# Patient Record
Sex: MALE | Race: WHITE | NOT HISPANIC OR LATINO | URBAN - NONMETROPOLITAN AREA
[De-identification: names, ages, dates, MRNs, and addresses within clinical notes are randomized per-mention and may not be internally consistent; named-entity substitution may affect disease eponyms.]

---

## 2017-03-17 ENCOUNTER — TELEPHONE (OUTPATIENT)
Dept: CARDIOLOGY | Facility: CLINIC | Age: 37
End: 2017-03-17

## 2017-03-17 NOTE — TELEPHONE ENCOUNTER
----- Message from Teresita Wynn sent at 3/16/2017 10:18 AM EDT -----  Regarding: MEDICATION  Contact: [phone]  PT NEEDS HIS BLOOD PRESSURE MEDICINE CALLED IN. HE IS COMING IN ON Monday AT 10:00.  HIS BLOOD PRESSURE /168.     PRESCRIPTION SHOP     CAN SOMEONE CALL PT?        I called the patient, he was unavailable but spoke to his wife.   I asked what RX he needed and she said that Sybil  told her he had to keep appt before we would send in refill for his B/P med.

## 2017-03-20 ENCOUNTER — OFFICE VISIT (OUTPATIENT)
Dept: CARDIOLOGY | Facility: CLINIC | Age: 37
End: 2017-03-20

## 2017-03-20 VITALS
DIASTOLIC BLOOD PRESSURE: 92 MMHG | RESPIRATION RATE: 16 BRPM | HEART RATE: 92 BPM | BODY MASS INDEX: 31.54 KG/M2 | SYSTOLIC BLOOD PRESSURE: 151 MMHG | WEIGHT: 238 LBS | HEIGHT: 73 IN

## 2017-03-20 DIAGNOSIS — Z91.199 NONCOMPLIANCE: ICD-10-CM

## 2017-03-20 DIAGNOSIS — R09.89 DECREASED PEDAL PULSES: ICD-10-CM

## 2017-03-20 DIAGNOSIS — I10 ESSENTIAL HYPERTENSION: ICD-10-CM

## 2017-03-20 DIAGNOSIS — R00.2 PALPITATIONS: Primary | ICD-10-CM

## 2017-03-20 PROCEDURE — 99214 OFFICE O/P EST MOD 30 MIN: CPT | Performed by: INTERNAL MEDICINE

## 2017-03-20 PROCEDURE — 93000 ELECTROCARDIOGRAM COMPLETE: CPT | Performed by: INTERNAL MEDICINE

## 2017-03-20 RX ORDER — AMLODIPINE BESYLATE 5 MG/1
5 TABLET ORAL DAILY
Qty: 30 TABLET | Refills: 3 | Status: SHIPPED | OUTPATIENT
Start: 2017-03-20 | End: 2017-12-13 | Stop reason: SDUPTHER

## 2017-03-20 RX ORDER — METOPROLOL TARTRATE 50 MG/1
50 TABLET, FILM COATED ORAL 2 TIMES DAILY
Qty: 30 TABLET | Refills: 3 | Status: SHIPPED | OUTPATIENT
Start: 2017-03-20 | End: 2017-03-22 | Stop reason: SDUPTHER

## 2017-03-20 NOTE — PROGRESS NOTES
"Bry Rodriguez  1980  03/20/2017   Ref:No referring provider defined for this encounter.  Pcp: Marek Stein MD  [facility] [address]    Follow up for:  Chief Complaint   Patient presents with   • Follow-up     1 & 1/2 years   • Shortness of Breath     \"some\"   • Palpitations     flutters        Patient Active Problem List   Diagnosis   • Hypertension with elevated diastolic blood pressure today   • Palpitations, no further   • Chronic minimal ST elevation with no acute changes.   • Decreased pedal pulses   • Noncompliance       HPI     Bry Rodriguez is a 35 yo male who presents to our office today for a follow-up of hypertension. Patient notes that he has been out of his BP medications for the last 2 months. He notes occasional palpitations \"flutters\".  Patient notes exercise of walking 1-2 miles every other day. He denies chest pain, shortness of breath, edema, dizziness, and no syncope. He has a high sodium diet.    Review of Systems   Constitution: Negative for chills and fever.   HENT: Negative for headaches, nosebleeds and sore throat.    Cardiovascular: Positive for palpitations.   Respiratory: Positive for shortness of breath. Negative for cough, hemoptysis and wheezing.    Gastrointestinal: Negative for abdominal pain, hematemesis, hematochezia, melena, nausea and vomiting.   Genitourinary: Negative for dysuria and hematuria.         Current Outpatient Prescriptions:   •  amLODIPine (NORVASC) 5 MG tablet, Take 1 tablet by mouth Daily., Disp: 30 tablet, Rfl: 3  •  metoprolol tartrate (LOPRESSOR) 50 MG tablet, Take 1 tablet by mouth 2 (Two) Times a Day., Disp: 30 tablet, Rfl: 3    No Known Allergies    Visit Vitals   • /92   • Pulse 92   • Resp 16   • Ht 73\" (185.4 cm)   • Wt 238 lb (108 kg)   • BMI 31.4 kg/m2          Physical Exam   Constitutional: He is oriented to person, place, and time. He appears well-developed and well-nourished. No distress.   Moderately obese  "   Neck: No JVD present. No tracheal deviation present.   Cardiovascular: Normal rate, regular rhythm, normal heart sounds and intact distal pulses.  Exam reveals no gallop and no friction rub.    No murmur heard.  Pulses:       Carotid pulses are 2+ on the right side, and 2+ on the left side.       Dorsalis pedis pulses are 1+ on the right side, and 1+ on the left side.        Posterior tibial pulses are 1+ on the right side, and 1+ on the left side.   Pulmonary/Chest: Effort normal and breath sounds normal. No respiratory distress. He has no wheezes. He has no rales. He exhibits no tenderness.   Abdominal: Soft. Bowel sounds are normal. He exhibits no distension and no mass. There is no tenderness. There is no rebound and no guarding.   Moderately obese   Musculoskeletal: He exhibits no edema or tenderness.   Neurological: He is alert and oriented to person, place, and time. No cranial nerve deficit. He exhibits normal muscle tone. Coordination normal.   Skin: Skin is warm and dry. No rash noted. He is not diaphoretic. No erythema. No pallor.   Psychiatric: He has a normal mood and affect. His behavior is normal. Judgment and thought content normal.   :    Lab Review:      ECG 12 Lead  Date/Time: 3/20/2017 10:24 AM  Performed by: JANNETH GASCA  Authorized by: JANNETH GASCA   Comparison: compared with previous ECG from 8/3/2015  Similar to previous ECG  Rhythm: sinus rhythm  Rate: normal  BPM: 78  Conduction: conduction normal  ST Segments: ST segments normal  T Waves: T waves normal  QRS axis: normal  Other findings: early repolarization  Clinical impression: abnormal ECG  Comments: QTc: 405            Lab Results   Component Value Date     01/17/2014    K 4.1 01/17/2014     01/17/2014    CO2 27.5 01/17/2014    BUN 11 01/17/2014    CREATININE 1.00 01/17/2014    GLUCOSE 106 (H) 01/17/2014    CALCIUM 9.5 01/17/2014    AST 43 (H) 01/17/2014    ALT 68 (H) 01/17/2014    ALKPHOS 50 01/17/2014     LABIL2 1.5 01/17/2014     No results found for: CKTOTAL  No results found for: WBC, HGB, HCT, PLT  No results found for: INR  No results found for: MG  No results found for: TSH, PSA, CHLPL, TRIG, HDL, LDL   No results found for: BNP    Chemistry        Component Value Date/Time     01/17/2014 0921    K 4.1 01/17/2014 0921     01/17/2014 0921    CO2 27.5 01/17/2014 0921    BUN 11 01/17/2014 0921    CREATININE 1.00 01/17/2014 0921        Component Value Date/Time    CALCIUM 9.5 01/17/2014 0921    ALKPHOS 50 01/17/2014 0921    AST 43 (H) 01/17/2014 0921    ALT 68 (H) 01/17/2014 0921    BILITOT 1.0 01/17/2014 0921            Impression:   Diagnosis Plan   1. Palpitations, no further     2. Essential hypertension  ECG 12 Lead    amLODIPine (NORVASC) 5 MG tablet    metoprolol tartrate (LOPRESSOR) 50 MG tablet    Basic Metabolic Panel    Lipid Panel   3. Noncompliance     4. Decreased pedal pulses         Plan:  1. Restart amlodipine 5 mg daily and Metoprolol tart 50 mg BID.   2. BMP  3. Fasting Lipid panel       Return in about 3 months (around 6/20/2017) for Recheck, Or sooner if needed.     This document signed by David Rico MD March 20, 2017 2:36 PM     I, David Rico MD, personally performed the services described in this documentation as scribed by the above named individual in my presence, and it is both accurate and complete.  5/8/2017  5:30 PM

## 2017-03-22 DIAGNOSIS — I10 ESSENTIAL HYPERTENSION: ICD-10-CM

## 2017-03-22 RX ORDER — METOPROLOL TARTRATE 50 MG/1
50 TABLET, FILM COATED ORAL 2 TIMES DAILY
Qty: 60 TABLET | Refills: 3
Start: 2017-03-22 | End: 2017-12-13 | Stop reason: SDUPTHER

## 2017-12-13 ENCOUNTER — TELEPHONE (OUTPATIENT)
Dept: CARDIOLOGY | Facility: CLINIC | Age: 37
End: 2017-12-13

## 2017-12-13 DIAGNOSIS — I10 ESSENTIAL HYPERTENSION: ICD-10-CM

## 2017-12-13 RX ORDER — AMLODIPINE BESYLATE 5 MG/1
5 TABLET ORAL DAILY
Qty: 30 TABLET | Refills: 2 | Status: SHIPPED | OUTPATIENT
Start: 2017-12-13 | End: 2022-08-15

## 2017-12-13 RX ORDER — METOPROLOL TARTRATE 50 MG/1
50 TABLET, FILM COATED ORAL 2 TIMES DAILY
Qty: 60 TABLET | Refills: 2
Start: 2017-12-13 | End: 2018-01-11 | Stop reason: SDUPTHER

## 2017-12-13 NOTE — TELEPHONE ENCOUNTER
Pt's pharmacy had sent over a refill request for his Metoprolol Tart 50 mg BID and Amlodipine 5 mg QD. I called pt to advise him. I spoke with his wife Lyric and advised her that Bry would need to make an apt and keep it to receive any further refills. She expressed understanding. I made him and apt for 2.5.18 at 2:40 with . I will send him enough of his medicine to do him til then.

## 2018-01-11 DIAGNOSIS — I10 ESSENTIAL HYPERTENSION: ICD-10-CM

## 2018-01-11 RX ORDER — METOPROLOL TARTRATE 50 MG/1
50 TABLET, FILM COATED ORAL EVERY 12 HOURS SCHEDULED
Qty: 60 TABLET | Refills: 0 | OUTPATIENT
Start: 2018-01-11

## 2022-07-01 ENCOUNTER — APPOINTMENT (OUTPATIENT)
Dept: ULTRASOUND IMAGING | Facility: HOSPITAL | Age: 42
End: 2022-07-01

## 2022-07-01 ENCOUNTER — TRANSCRIBE ORDERS (OUTPATIENT)
Dept: ADMINISTRATIVE | Facility: HOSPITAL | Age: 42
End: 2022-07-01

## 2022-07-01 DIAGNOSIS — R10.11 ABDOMINAL PAIN, RIGHT UPPER QUADRANT: Primary | ICD-10-CM

## 2022-07-07 ENCOUNTER — HOSPITAL ENCOUNTER (OUTPATIENT)
Dept: ULTRASOUND IMAGING | Facility: HOSPITAL | Age: 42
Discharge: HOME | End: 2022-07-07
Admitting: NURSE PRACTITIONER

## 2022-07-07 DIAGNOSIS — R10.11 ABDOMINAL PAIN, RIGHT UPPER QUADRANT: ICD-10-CM

## 2022-07-07 PROCEDURE — 76700 US EXAM ABDOM COMPLETE: CPT | Performed by: RADIOLOGY

## 2022-07-07 PROCEDURE — 76700 US EXAM ABDOM COMPLETE: CPT

## 2022-08-10 ENCOUNTER — OFFICE VISIT (OUTPATIENT)
Dept: SURGERY | Facility: CLINIC | Age: 42
End: 2022-08-10

## 2022-08-10 VITALS
SYSTOLIC BLOOD PRESSURE: 110 MMHG | DIASTOLIC BLOOD PRESSURE: 76 MMHG | HEIGHT: 73 IN | BODY MASS INDEX: 27.8 KG/M2 | WEIGHT: 209.8 LBS

## 2022-08-10 DIAGNOSIS — K80.20 CALCULUS OF GALLBLADDER WITHOUT CHOLECYSTITIS WITHOUT OBSTRUCTION: Primary | ICD-10-CM

## 2022-08-10 PROCEDURE — 99203 OFFICE O/P NEW LOW 30 MIN: CPT | Performed by: SURGERY

## 2022-08-10 RX ORDER — HYDROCODONE BITARTRATE AND ACETAMINOPHEN 5; 325 MG/1; MG/1
TABLET ORAL
COMMUNITY
Start: 2022-08-04 | End: 2022-08-15

## 2022-08-10 RX ORDER — PANTOPRAZOLE SODIUM 40 MG/1
TABLET, DELAYED RELEASE ORAL
COMMUNITY
Start: 2022-07-31

## 2022-08-10 RX ORDER — INDOCYANINE GREEN AND WATER 25 MG
2.5 KIT INJECTION ONCE
Status: CANCELLED | OUTPATIENT
Start: 2022-08-10 | End: 2022-08-10

## 2022-08-10 NOTE — PROGRESS NOTES
"Subjective   Bry Rodriguez is a 42 y.o. male is being seen for consultation today at the request of Nallely Poon FNP    Bry Rodriguez is a 42 y.o. male With right upper quadrant pain after fatty greasy meals for several months.  Pain radiated to the right back and epigastrium.  A severe episode occurred requiring ER presentation with CT scan showing gallstones.  No signs of cholecystitis.  No previous abdominal surgery.      Past Medical History:   Diagnosis Date   • Hypertension    • Palpitations        Family History   Problem Relation Age of Onset   • Heart attack Mother 48   • Heart attack Father 64   • Heart disease Maternal Grandmother    • Heart disease Maternal Grandfather        Social History     Socioeconomic History   • Marital status:    Tobacco Use   • Smoking status: Never Smoker   • Smokeless tobacco: Never Used   Vaping Use   • Vaping Use: Never used   Substance and Sexual Activity   • Alcohol use: Yes     Comment: \"drinks socially\"   • Drug use: No   • Sexual activity: Defer       History reviewed. No pertinent surgical history.    Review of Systems   Constitutional: Negative for activity change, appetite change, chills and fever.   HENT: Negative for sore throat and trouble swallowing.    Eyes: Negative for visual disturbance.   Respiratory: Negative for cough and shortness of breath.    Cardiovascular: Negative for chest pain and palpitations.   Gastrointestinal: Positive for abdominal pain. Negative for abdominal distention, blood in stool, constipation, diarrhea, nausea and vomiting.   Endocrine: Negative for cold intolerance and heat intolerance.   Genitourinary: Negative for dysuria.   Musculoskeletal: Negative for joint swelling.   Skin: Negative for color change, rash and wound.   Allergic/Immunologic: Negative for immunocompromised state.   Neurological: Negative for dizziness, seizures, weakness and headaches.   Hematological: Negative for adenopathy. Does not " "bruise/bleed easily.   Psychiatric/Behavioral: Negative for agitation and confusion.         /76   Ht 185.4 cm (73\")   Wt 95.2 kg (209 lb 12.8 oz)   BMI 27.68 kg/m²   Objective   Physical Exam  Constitutional:       Appearance: He is well-developed.   HENT:      Head: Normocephalic and atraumatic.   Eyes:      Conjunctiva/sclera: Conjunctivae normal.      Pupils: Pupils are equal, round, and reactive to light.   Neck:      Thyroid: No thyromegaly.      Vascular: No JVD.      Trachea: No tracheal deviation.   Cardiovascular:      Rate and Rhythm: Normal rate and regular rhythm.      Heart sounds: No murmur heard.    No friction rub. No gallop.   Pulmonary:      Effort: Pulmonary effort is normal.      Breath sounds: Normal breath sounds.   Abdominal:      General: There is no distension.      Palpations: Abdomen is soft. There is no hepatomegaly or splenomegaly.      Tenderness: There is no abdominal tenderness.      Hernia: No hernia is present.   Musculoskeletal:         General: No deformity. Normal range of motion.      Cervical back: Neck supple.   Skin:     General: Skin is warm and dry.   Neurological:      Mental Status: He is alert and oriented to person, place, and time.               Assessment   Diagnoses and all orders for this visit:    1. Calculus of gallbladder without cholecystitis without obstruction (Primary)  -     Case Request; Standing  -     CBC & Differential; Future  -     Comprehensive Metabolic Panel; Future  -     Case Request    Other orders  -     SCANNED - IMAGING  -     Follow Anesthesia Guidelines / Standing Orders; Future  -     Obtain Informed Consent; Future  -     Provide NPO Instructions to Patient; Future  -     Chlorhexidine Skin Prep; Future      Bry Rodriguez is a 42 y.o. male with symptomatic cholelithiasis.  Risks and benefits have been discussed with the patient and he will proceed with cholecystectomy.    BMI is >= 25 and <30. (Overweight) The following " options were offered after discussion;: weight loss educational material (shared in after visit summary)

## 2022-08-11 ENCOUNTER — TELEPHONE (OUTPATIENT)
Dept: SURGERY | Facility: CLINIC | Age: 42
End: 2022-08-11

## 2022-08-12 ENCOUNTER — TELEPHONE (OUTPATIENT)
Dept: SURGERY | Facility: CLINIC | Age: 42
End: 2022-08-12

## 2022-08-12 NOTE — TELEPHONE ENCOUNTER
Second attempt to contact patient about his upcoming surgery with . Left a message for patient to return my call today.       DL 8/12/22 10:40am

## 2022-08-15 ENCOUNTER — PRE-ADMISSION TESTING (OUTPATIENT)
Dept: PREADMISSION TESTING | Facility: HOSPITAL | Age: 42
End: 2022-08-15

## 2022-08-15 PROCEDURE — 93005 ELECTROCARDIOGRAM TRACING: CPT

## 2022-08-15 PROCEDURE — 93010 ELECTROCARDIOGRAM REPORT: CPT | Performed by: INTERNAL MEDICINE

## 2022-08-15 NOTE — DISCHARGE INSTRUCTIONS
8/18/22  ARRIVAL TIME PER DR N[place] OFFICE    TAKE the following medications the morning of surgery:  All heart or blood pressure medications    HOLD all diabetic medications the morning of surgery as ordered by physician.    Please discontinue all blood thinners and anticoagulants (except aspirin) prior to surgery as per your surgeon and cardiologist instructions.  Aspirin may be continued up to the day prior to surgery.     CHLORHEXIDINE CLOTHS GIVEN WITH INSTRUCTIONS AND FORM TO RETURN TO HOSPITAL, IF APPLICABLE.    General Instructions:  Do not eat or drink after midnight: includes water, mints, or gum. You may brush your teeth.  Dental appliances that are removable must be taken out day of surgery.  Do not smoke, chew tobacco, or drink alcohol.  Bring medications in original bottles, any inhalers and if applicable your C-PAP/BI-PAP machine.  Bring any papers given to you in the doctor's office.  Wear clean comfortable clothes and socks.  Do not wear contact lenses or make-up. Bring a case for your glasses if applicable.  Bring crutches or walker if applicable.  Leave all other valuables and jewelry at home.    If you were given a blood bank ID arm band remember to bring it with you the day of surgery.    Preventing a Surgical Site Infection:  Shower the night before surgery (unless instructed other wise) using a fresh bar of anti-bacterial soap (such as Dial) and clean washcloth. Dry with a clean towel and dress in clean clothing.  For 2 to 3 days before surgery, avoid shaving with a razor near where you will have surgery because the razor can irritate skin and make it easier to develop an infection. Ask your surgeon if you will be receiving antibiotics prior to surgery.  Make sure you, your family, and all healthcare providers clear their hands with soap and water or an alcohol-based hand  before caring for you or your wound.  If at all possible, quit smoking as many days before surgery as you  can.    Day of surgery:  Upon arrival, a Pre-op nurse and Anesthesiologist will review your health history, obtain vital signs, and answer questions you may have. The only belongings needed at this time will be your home medications and if applicable your C-PAP/BI-PAP machine. If you are staying overnight your family can leave the rest of your belongings in the car and bring them to your room later. A Pre-op nurse will start an IV and you may receive medication in preparation for surgery, including something to help you relax. Your family will be able to see you in the Pre-op area. While you are in surgery your family should notify the waiting room  if they leave the waiting room area and provide a contact phone number.    Please be aware that surgery does come with discomfort. We want to make every effort to control your discomfort so please discuss any uncontrolled symptoms with your nurse. Your doctor will most likely have prescribed pain medications.    If you are going home after surgery you will receive individualized written care instructions before being discharged. A responsible adult must drive you to and from the hospital on the day of surgery and stay with you for 24 hours.    If you are staying overnight following surgery, you will be transported to your hospital room following the recovery period.  [facility] has all private rooms.    If you have any questions please call Pre-Admission Testing at [phone].  Deductibles and co-payments are collected on the day of service. Please be prepared to pay the required co-pay, deductible or deposit on the day of service as defined by your plan.    A RESPONSIBLE PERSON MUST REMAIN IN THE WAITING ROOM DURING YOUR PROCEDURE AND A RESPONSIBLE  MUST BE AVAILABLE UPON YOUR DISCHARGE.

## 2022-08-16 LAB
QT INTERVAL: 406 MS
QTC INTERVAL: 415 MS

## 2022-08-18 ENCOUNTER — APPOINTMENT (OUTPATIENT)
Dept: GENERAL RADIOLOGY | Facility: HOSPITAL | Age: 42
End: 2022-08-18

## 2022-08-18 ENCOUNTER — HOSPITAL ENCOUNTER (OUTPATIENT)
Facility: HOSPITAL | Age: 42
Setting detail: HOSPITAL OUTPATIENT SURGERY
Discharge: HOME | End: 2022-08-18
Attending: SURGERY | Admitting: SURGERY

## 2022-08-18 ENCOUNTER — ANESTHESIA EVENT (OUTPATIENT)
Dept: PERIOP | Facility: HOSPITAL | Age: 42
End: 2022-08-18

## 2022-08-18 ENCOUNTER — ANESTHESIA (OUTPATIENT)
Dept: PERIOP | Facility: HOSPITAL | Age: 42
End: 2022-08-18

## 2022-08-18 VITALS
HEIGHT: 74 IN | HEART RATE: 53 BPM | TEMPERATURE: 97.6 F | SYSTOLIC BLOOD PRESSURE: 120 MMHG | RESPIRATION RATE: 18 BRPM | DIASTOLIC BLOOD PRESSURE: 79 MMHG | BODY MASS INDEX: 26.15 KG/M2 | OXYGEN SATURATION: 96 % | WEIGHT: 203.8 LBS

## 2022-08-18 DIAGNOSIS — K80.20 CALCULUS OF GALLBLADDER WITHOUT CHOLECYSTITIS WITHOUT OBSTRUCTION: ICD-10-CM

## 2022-08-18 PROCEDURE — 25010000002 NEOSTIGMINE 10 MG/10ML SOLUTION: Performed by: NURSE ANESTHETIST, CERTIFIED REGISTERED

## 2022-08-18 PROCEDURE — 25010000002 MIDAZOLAM PER 1 MG: Performed by: NURSE ANESTHETIST, CERTIFIED REGISTERED

## 2022-08-18 PROCEDURE — 25010000002 FENTANYL CITRATE (PF) 50 MCG/ML SOLUTION: Performed by: NURSE ANESTHETIST, CERTIFIED REGISTERED

## 2022-08-18 PROCEDURE — 25010000002 ROPIVACAINE PER 1 MG: Performed by: ANESTHESIOLOGY

## 2022-08-18 PROCEDURE — 0 LIDOCAINE 1 % SOLUTION: Performed by: NURSE ANESTHETIST, CERTIFIED REGISTERED

## 2022-08-18 PROCEDURE — 25010000002 KETOROLAC TROMETHAMINE PER 15 MG: Performed by: NURSE ANESTHETIST, CERTIFIED REGISTERED

## 2022-08-18 PROCEDURE — 25010000002 DEXAMETHASONE PER 1 MG: Performed by: ANESTHESIOLOGY

## 2022-08-18 PROCEDURE — 47562 LAPAROSCOPIC CHOLECYSTECTOMY: CPT | Performed by: SURGERY

## 2022-08-18 PROCEDURE — 25010000002 ONDANSETRON PER 1 MG: Performed by: NURSE ANESTHETIST, CERTIFIED REGISTERED

## 2022-08-18 PROCEDURE — 25010000002 PROPOFOL 10 MG/ML EMULSION: Performed by: NURSE ANESTHETIST, CERTIFIED REGISTERED

## 2022-08-18 PROCEDURE — 25010000002 AMPICILLIN-SULBACTAM PER 1.5 G: Performed by: SURGERY

## 2022-08-18 PROCEDURE — S2900 ROBOTIC SURGICAL SYSTEM: HCPCS | Performed by: SURGERY

## 2022-08-18 PROCEDURE — 25010000002 BUPRENORPHINE PER 0.1 MG: Performed by: ANESTHESIOLOGY

## 2022-08-18 DEVICE — HEMOLOK L 6 CLIPS/CART
Type: IMPLANTABLE DEVICE | Site: ABDOMEN | Status: FUNCTIONAL
Brand: WECK

## 2022-08-18 RX ORDER — ROPIVACAINE HYDROCHLORIDE 5 MG/ML
INJECTION, SOLUTION EPIDURAL; INFILTRATION; PERINEURAL
Status: COMPLETED | OUTPATIENT
Start: 2022-08-18 | End: 2022-08-18

## 2022-08-18 RX ORDER — KETOROLAC TROMETHAMINE 30 MG/ML
30 INJECTION, SOLUTION INTRAMUSCULAR; INTRAVENOUS EVERY 6 HOURS PRN
Status: COMPLETED | OUTPATIENT
Start: 2022-08-18 | End: 2022-08-18

## 2022-08-18 RX ORDER — FENTANYL CITRATE 50 UG/ML
50 INJECTION, SOLUTION INTRAMUSCULAR; INTRAVENOUS
Status: DISCONTINUED | OUTPATIENT
Start: 2022-08-18 | End: 2022-08-18 | Stop reason: HOSPADM

## 2022-08-18 RX ORDER — ACETAMINOPHEN 500 MG
1000 TABLET ORAL EVERY 6 HOURS PRN
COMMUNITY

## 2022-08-18 RX ORDER — ACETAMINOPHEN 325 MG/1
650 TABLET ORAL EVERY 4 HOURS PRN
Qty: 30 TABLET | Refills: 0 | Status: SHIPPED | OUTPATIENT
Start: 2022-08-18

## 2022-08-18 RX ORDER — FENTANYL CITRATE 50 UG/ML
INJECTION, SOLUTION INTRAMUSCULAR; INTRAVENOUS AS NEEDED
Status: DISCONTINUED | OUTPATIENT
Start: 2022-08-18 | End: 2022-08-18 | Stop reason: SURG

## 2022-08-18 RX ORDER — ROCURONIUM BROMIDE 10 MG/ML
INJECTION, SOLUTION INTRAVENOUS AS NEEDED
Status: DISCONTINUED | OUTPATIENT
Start: 2022-08-18 | End: 2022-08-18 | Stop reason: SURG

## 2022-08-18 RX ORDER — SODIUM CHLORIDE, SODIUM LACTATE, POTASSIUM CHLORIDE, CALCIUM CHLORIDE 600; 310; 30; 20 MG/100ML; MG/100ML; MG/100ML; MG/100ML
INJECTION, SOLUTION INTRAVENOUS CONTINUOUS PRN
Status: DISCONTINUED | OUTPATIENT
Start: 2022-08-18 | End: 2022-08-18 | Stop reason: SURG

## 2022-08-18 RX ORDER — IBUPROFEN 600 MG/1
600 TABLET ORAL EVERY 6 HOURS PRN
Qty: 30 TABLET | Refills: 0 | Status: SHIPPED | OUTPATIENT
Start: 2022-08-18

## 2022-08-18 RX ORDER — TRAMADOL HYDROCHLORIDE 50 MG/1
50 TABLET ORAL EVERY 8 HOURS PRN
Qty: 8 TABLET | Refills: 0 | Status: SHIPPED | OUTPATIENT
Start: 2022-08-18

## 2022-08-18 RX ORDER — SODIUM CHLORIDE 0.9 % (FLUSH) 0.9 %
10 SYRINGE (ML) INJECTION EVERY 12 HOURS SCHEDULED
Status: DISCONTINUED | OUTPATIENT
Start: 2022-08-18 | End: 2022-08-18 | Stop reason: HOSPADM

## 2022-08-18 RX ORDER — MEPERIDINE HYDROCHLORIDE 25 MG/ML
12.5 INJECTION INTRAMUSCULAR; INTRAVENOUS; SUBCUTANEOUS
Status: DISCONTINUED | OUTPATIENT
Start: 2022-08-18 | End: 2022-08-18 | Stop reason: HOSPADM

## 2022-08-18 RX ORDER — MAGNESIUM HYDROXIDE 1200 MG/15ML
LIQUID ORAL AS NEEDED
Status: DISCONTINUED | OUTPATIENT
Start: 2022-08-18 | End: 2022-08-18 | Stop reason: HOSPADM

## 2022-08-18 RX ORDER — ONDANSETRON 2 MG/ML
4 INJECTION INTRAMUSCULAR; INTRAVENOUS AS NEEDED
Status: DISCONTINUED | OUTPATIENT
Start: 2022-08-18 | End: 2022-08-18 | Stop reason: HOSPADM

## 2022-08-18 RX ORDER — MIDAZOLAM HYDROCHLORIDE 1 MG/ML
1 INJECTION INTRAMUSCULAR; INTRAVENOUS
Status: DISCONTINUED | OUTPATIENT
Start: 2022-08-18 | End: 2022-08-18 | Stop reason: HOSPADM

## 2022-08-18 RX ORDER — SODIUM CHLORIDE, SODIUM LACTATE, POTASSIUM CHLORIDE, CALCIUM CHLORIDE 600; 310; 30; 20 MG/100ML; MG/100ML; MG/100ML; MG/100ML
100 INJECTION, SOLUTION INTRAVENOUS ONCE AS NEEDED
Status: DISCONTINUED | OUTPATIENT
Start: 2022-08-18 | End: 2022-08-18 | Stop reason: HOSPADM

## 2022-08-18 RX ORDER — OXYCODONE HYDROCHLORIDE AND ACETAMINOPHEN 5; 325 MG/1; MG/1
1 TABLET ORAL ONCE AS NEEDED
Status: COMPLETED | OUTPATIENT
Start: 2022-08-18 | End: 2022-08-18

## 2022-08-18 RX ORDER — ONDANSETRON 2 MG/ML
INJECTION INTRAMUSCULAR; INTRAVENOUS AS NEEDED
Status: DISCONTINUED | OUTPATIENT
Start: 2022-08-18 | End: 2022-08-18 | Stop reason: SURG

## 2022-08-18 RX ORDER — IPRATROPIUM BROMIDE AND ALBUTEROL SULFATE 2.5; .5 MG/3ML; MG/3ML
3 SOLUTION RESPIRATORY (INHALATION) ONCE AS NEEDED
Status: DISCONTINUED | OUTPATIENT
Start: 2022-08-18 | End: 2022-08-18 | Stop reason: HOSPADM

## 2022-08-18 RX ORDER — INDOCYANINE GREEN AND WATER 25 MG
2.5 KIT INJECTION ONCE
Status: COMPLETED | OUTPATIENT
Start: 2022-08-18 | End: 2022-08-18

## 2022-08-18 RX ORDER — SODIUM CHLORIDE, SODIUM LACTATE, POTASSIUM CHLORIDE, CALCIUM CHLORIDE 600; 310; 30; 20 MG/100ML; MG/100ML; MG/100ML; MG/100ML
125 INJECTION, SOLUTION INTRAVENOUS ONCE
Status: COMPLETED | OUTPATIENT
Start: 2022-08-18 | End: 2022-08-18

## 2022-08-18 RX ORDER — BUPRENORPHINE HYDROCHLORIDE 0.32 MG/ML
INJECTION INTRAMUSCULAR; INTRAVENOUS
Status: COMPLETED | OUTPATIENT
Start: 2022-08-18 | End: 2022-08-18

## 2022-08-18 RX ORDER — NEOSTIGMINE METHYLSULFATE 1 MG/ML
INJECTION, SOLUTION INTRAVENOUS AS NEEDED
Status: DISCONTINUED | OUTPATIENT
Start: 2022-08-18 | End: 2022-08-18 | Stop reason: SURG

## 2022-08-18 RX ORDER — SODIUM CHLORIDE 0.9 % (FLUSH) 0.9 %
10 SYRINGE (ML) INJECTION AS NEEDED
Status: DISCONTINUED | OUTPATIENT
Start: 2022-08-18 | End: 2022-08-18 | Stop reason: HOSPADM

## 2022-08-18 RX ORDER — DEXAMETHASONE SODIUM PHOSPHATE 4 MG/ML
INJECTION, SOLUTION INTRA-ARTICULAR; INTRALESIONAL; INTRAMUSCULAR; INTRAVENOUS; SOFT TISSUE
Status: COMPLETED | OUTPATIENT
Start: 2022-08-18 | End: 2022-08-18

## 2022-08-18 RX ORDER — GLYCOPYRROLATE 0.2 MG/ML
INJECTION INTRAMUSCULAR; INTRAVENOUS AS NEEDED
Status: DISCONTINUED | OUTPATIENT
Start: 2022-08-18 | End: 2022-08-18 | Stop reason: SURG

## 2022-08-18 RX ORDER — MIDAZOLAM HYDROCHLORIDE 1 MG/ML
INJECTION INTRAMUSCULAR; INTRAVENOUS AS NEEDED
Status: DISCONTINUED | OUTPATIENT
Start: 2022-08-18 | End: 2022-08-18 | Stop reason: SURG

## 2022-08-18 RX ORDER — PROPOFOL 10 MG/ML
VIAL (ML) INTRAVENOUS AS NEEDED
Status: DISCONTINUED | OUTPATIENT
Start: 2022-08-18 | End: 2022-08-18 | Stop reason: SURG

## 2022-08-18 RX ORDER — SODIUM CHLORIDE 9 MG/ML
INJECTION, SOLUTION INTRAVENOUS AS NEEDED
Status: DISCONTINUED | OUTPATIENT
Start: 2022-08-18 | End: 2022-08-18 | Stop reason: HOSPADM

## 2022-08-18 RX ORDER — FAMOTIDINE 10 MG/ML
INJECTION, SOLUTION INTRAVENOUS AS NEEDED
Status: DISCONTINUED | OUTPATIENT
Start: 2022-08-18 | End: 2022-08-18 | Stop reason: SURG

## 2022-08-18 RX ORDER — LIDOCAINE HYDROCHLORIDE 10 MG/ML
INJECTION, SOLUTION INFILTRATION; PERINEURAL AS NEEDED
Status: DISCONTINUED | OUTPATIENT
Start: 2022-08-18 | End: 2022-08-18 | Stop reason: SURG

## 2022-08-18 RX ADMIN — PROPOFOL 200 MG: 10 INJECTION, EMULSION INTRAVENOUS at 11:12

## 2022-08-18 RX ADMIN — FENTANYL CITRATE 50 MCG: 50 INJECTION INTRAMUSCULAR; INTRAVENOUS at 11:18

## 2022-08-18 RX ADMIN — NEOSTIGMINE 3 MG: 1 INJECTION INTRAVENOUS at 11:54

## 2022-08-18 RX ADMIN — AMPICILLIN SODIUM AND SULBACTAM SODIUM 3 G: 2; 1 INJECTION, POWDER, FOR SOLUTION INTRAMUSCULAR; INTRAVENOUS at 11:19

## 2022-08-18 RX ADMIN — SODIUM CHLORIDE, POTASSIUM CHLORIDE, SODIUM LACTATE AND CALCIUM CHLORIDE: 600; 310; 30; 20 INJECTION, SOLUTION INTRAVENOUS at 11:09

## 2022-08-18 RX ADMIN — KETOROLAC TROMETHAMINE 30 MG: 30 INJECTION, SOLUTION INTRAMUSCULAR at 12:12

## 2022-08-18 RX ADMIN — INDOCYANINE GREEN AND WATER 2.5 MG: KIT at 08:54

## 2022-08-18 RX ADMIN — FAMOTIDINE 20 MG: 10 INJECTION INTRAVENOUS at 11:09

## 2022-08-18 RX ADMIN — MIDAZOLAM HYDROCHLORIDE 2 MG: 1 INJECTION, SOLUTION INTRAMUSCULAR; INTRAVENOUS at 11:09

## 2022-08-18 RX ADMIN — ONDANSETRON 4 MG: 2 INJECTION INTRAMUSCULAR; INTRAVENOUS at 11:37

## 2022-08-18 RX ADMIN — SODIUM CHLORIDE, POTASSIUM CHLORIDE, SODIUM LACTATE AND CALCIUM CHLORIDE 125 ML/HR: 600; 310; 30; 20 INJECTION, SOLUTION INTRAVENOUS at 08:54

## 2022-08-18 RX ADMIN — BUPRENORPHINE HYDROCHLORIDE 0.3 MG: 0.32 INJECTION INTRAMUSCULAR; INTRAVENOUS at 11:20

## 2022-08-18 RX ADMIN — FENTANYL CITRATE 50 MCG: 50 INJECTION INTRAMUSCULAR; INTRAVENOUS at 11:12

## 2022-08-18 RX ADMIN — ROCURONIUM BROMIDE 30 MG: 10 SOLUTION INTRAVENOUS at 11:12

## 2022-08-18 RX ADMIN — OXYCODONE HYDROCHLORIDE AND ACETAMINOPHEN 1 TABLET: 5; 325 TABLET ORAL at 12:52

## 2022-08-18 RX ADMIN — LIDOCAINE HYDROCHLORIDE 40 MG: 10 INJECTION, SOLUTION INFILTRATION; PERINEURAL at 11:12

## 2022-08-18 RX ADMIN — DEXAMETHASONE SODIUM PHOSPHATE 8 MG: 4 INJECTION, SOLUTION INTRA-ARTICULAR; INTRALESIONAL; INTRAMUSCULAR; INTRAVENOUS; SOFT TISSUE at 11:20

## 2022-08-18 RX ADMIN — GLYCOPYRROLATE 0.4 MG: 0.2 INJECTION, SOLUTION INTRAMUSCULAR; INTRAVENOUS at 11:54

## 2022-08-18 RX ADMIN — ROPIVACAINE HYDROCHLORIDE 200 MG: 5 INJECTION, SOLUTION EPIDURAL; INFILTRATION; PERINEURAL at 11:20

## 2022-08-18 NOTE — OP NOTE
CHOLECYSTECTOMY LAPAROSCOPIC WITH DAVINCI ROBOT  Procedure Note    Bry Rodriguez  8/18/2022    Pre-op Diagnosis:   Calculus of gallbladder without cholecystitis without obstruction [K80.20]    Post-op Diagnosis:     Post-Op Diagnosis Codes:     * Calculus of gallbladder without cholecystitis without obstruction [K80.20]    Procedure(s):  CHOLECYSTECTOMY LAPAROSCOPIC WITH DAVINCI ROBOT    Surgeon(s):  Amandeep Rogers MD    Anesthesia: General    Staff:   Circulator: Maine Alba RN  Scrub Person: Magdalena Vazquez  Assistant: Natalie Witt CSA    Findings: distended gallbladder, critical view of safety obtained    Operative Procedure: The patient was taken to the operating suite and placed supine on operating table.  Bilateral sequential compression devices were applied and general endotracheal anesthesia administered.  The abdomen was prepped and draped in usual sterile fashion.  Preoperative antibiotics were confirmed.  Timeout procedure was performed.  A Veress needle was inserted at Vora's point and saline drop test confirmed entry into the peritoneal space.  Pneumoperitoneum was established.  An 8 mm Optiview trocar was inserted through an infraumbilical incision.  The laparoscope was inserted and the Veress needle site was free of injury.  The Veress needle site was removed and upsized to an 8 mm robotic trocar.  A second 8 mm robotic trocar was placed in the anterior axillary line at the level of the umbilicus of the right abdomen.  A 5 mm Optiview trocar was then placed as far lateral as possible in the right abdomen for an assistant trocar.  At this time the robot was docked to the trocars and the robotic camera inserted.  The patient had been placed in reverse Trendelenburg with left lateral tilt prior to docking.  I then exited the sterile field and entered the robotic console.  My assistant placed a robotic hook in the right arm #1 and a fenestrated bipolar in the left arm #2.   My assistant grasped the fundus of the gallbladder and retracted anteriorly and superiorly. The gallbladder was distended.  Mild adhesions to the fundus and infundibulum were taken down with cautery hook.  The infundibulum was exposed and grasped and retracted laterally and inferiorly.  The peritoneum on the anterior posterior surface of the gallbladder was opened with the cautery hook.  The gallbladder was elevated from the gallbladder fossa for a portion and the cystic duct and artery were identified and dissected free circumferentially.  All adventitial tissue between the cystic duct and artery was dissected free with the cautery hook.  The cystic plate was visible from the anterior and posterior views with only the cystic duct and artery seen entering the gallbladder.  With the critical view of safety obtained Firefly confirmed no abnormal ductal abnormality and at this time the cystic artery was controlled with a single Hemoclip placed proximally on the artery and the cystic duct controlled with 2 hemoclips placed on the cystic duct stump side and one on the infundibular side of the duct. The artery was divided with the cautery hook with no evidence of bleeding.  The cystic duct was divided with the cut mode of the cautery hook with no evidence of cystic duct stump leak.  The gallbladder was then removed from the gallbladder fossa intact.  This was done with the cautery hook.  Firefly was used to confirm no evidence of duct of Luschka or accessory duct leakage.  There was no cystic duct stump leakage.  At this time robotic instruments were removed under direct visualization.  The robot was undocked and I entered the sterile field for completion of the case.  A 5 mm laparoscope was inserted through a robotic trocar and the gallbladder was placed in a 5 mm Endo Catch bag. It was removed through the infraumbilical fascial defect.  The gallbladder fossa was hemostatic and at this time all trocars were removed under  direct visualization and pneumoperitoneum was evacuated.  The infraumbilical fascial defect was closed with a figure-of-eight Vicryl suture and all skin incisions closed with Monocryl subcuticular suture and dressed with Skin Affix.  The patient tolerated the procedure well.    Estimated Blood Loss: 10mL    Specimens:   Gallbladder           Drains: none    Grafts/Implants:  none    Complications: none      Amandeep Rogers MD     Date: 8/18/2022  Time: 13:11 EDT

## 2022-08-18 NOTE — ANESTHESIA POSTPROCEDURE EVALUATION
Patient: Bry Rodriguez    Procedure Summary     Date: 08/18/22 Room / Location: [facility] OR 04 /  [facility] OR    Anesthesia Start: 1109 Anesthesia Stop: 1204    Procedure: CHOLECYSTECTOMY LAPAROSCOPIC WITH DAVINCI ROBOT (N/A Abdomen) Diagnosis:       Calculus of gallbladder without cholecystitis without obstruction      (Calculus of gallbladder without cholecystitis without obstruction [K80.20])    Surgeons: Amandeep Rogers MD Provider: Cricket Peoples DO    Anesthesia Type: general with block ASA Status: 2          Anesthesia Type: general with block    Vitals  Vitals Value Taken Time   /78 08/18/22 1240   Temp 97.6 °F (36.4 °C) 08/18/22 1206   Pulse 53 08/18/22 1240   Resp 16 08/18/22 1240   SpO2 97 % 08/18/22 1240           Post Anesthesia Care and Evaluation    Patient location during evaluation: PHASE II  Patient participation: complete - patient participated  Level of consciousness: awake and alert  Pain score: 1  Pain management: adequate    Airway patency: patent  Anesthetic complications: No anesthetic complications  PONV Status: controlled  Cardiovascular status: acceptable  Respiratory status: acceptable and room air  Hydration status: euvolemic  No anesthesia care post op

## 2022-08-18 NOTE — ANESTHESIA PROCEDURE NOTES
"Peripheral Block      Patient reassessed immediately prior to procedure    Patient location during procedure: OR  Start time: 8/18/2022 11:19 AM  Stop time: 8/18/2022 11:22 AM  Reason for block: at surgeon's request and post-op pain management  Performed by  CRNA/CAA: Mckenzie rGier CRNA  Preanesthetic Checklist  Completed: patient identified, IV checked, site marked, risks and benefits discussed, surgical consent, monitors and equipment checked, pre-op evaluation and timeout performed  Prep:  Pt Position: supine  Sterile barriers:cap, gloves, sterile barriers and mask  Prep: ChloraPrep  Patient monitoring: blood pressure monitoring, continuous pulse oximetry and EKG  Procedure    Nursing cardiac assessment comments yes: Sedation, GA, Spinal,Epidural   Performed under: general  Guidance:ultrasound guided    ULTRASOUND INTERPRETATION. Using ultrasound guidance a 20 G (20g 4\" Stimuplex) gauge needle was placed in close proximity to the nerve, at which point, under ultrasound guidance anesthetic was injected in the area of the nerve and spread of the anesthesia was seen on ultrasound in close proximity thereto.  There were no abnormalities seen on ultrasound; a digital image was taken; and the patient tolerated the procedure with no complications. Images:still images obtained    Laterality:Bilateral  Block Type:TAP  Injection Technique:single-shot  Needle Type:short-bevel  Needle Gauge:20 G  Resistance on Injection: none    Medications Used: ropivacaine (NAROPIN) injection 0.5 %, 200 mg  dexamethasone (DECADRON) injection, 8 mg  buprenorphine (BUPRENEX) injection, 0.3 mg  Med administered at 8/18/2022 11:20 AM      Medications  Preservative Free Saline:20ml  Comment:Block Injection:  Total volume divided equally between all 4 injection sites      Post Assessment  Injection Assessment: negative aspiration for heme, incremental injection and no paresthesia on injection  Patient Tolerance:comfortable throughout " block  Complications:no  Additional Notes  The pt was in the supine position under general anesthesia    Under Ultrasound guidance, a BBraun 4inch 360 degree needle was advanced with Normal Saline hydro dissection of tissue.  The Internal Oblique and Transversus Abdominus muscles where visualized.  At or before the aponeurosis of Internal Oblique, local anesthetic spread was visualized in the Transversus Abdominus Plane. Injection was made incrementally with aspiration every 5 mls.  There was no  intravascular injection,  injection pressure was normal, there was no neural injection, and the procedure was completed without difficulty. The same procedure was completed for left and right sided lateral tap blocks.    Under Ultrasound guidance, a Menezes 4inch 360 degree needle was advanced with Normal Saline hydro dissection of tissue.  The Rectus and Transversus Abdominus muscles where visualized.  The needle tip was placed between the Transversus Abdominus and rectus abdominus, local anesthetic spread was visualized in the Transversus Abdominus Plane. Injection was made incrementally with aspiration every 5 mls.  There was no  intravascular injection,  injection pressure was normal, there was no neural injection, and the procedure was completed without difficulty. The same procedure was completed for left and right sided subcostal tap blocks. Thank You.

## 2022-08-18 NOTE — ANESTHESIA PROCEDURE NOTES
Airway  Urgency: elective    Date/Time: 8/18/2022 11:15 AM  Airway not difficult    General Information and Staff    Patient location during procedure: OR    Indications and Patient Condition  Indications for airway management: airway protection    Preoxygenated: yes  Mask difficulty assessment: 1 - vent by mask    Final Airway Details  Final airway type: endotracheal airway      Successful airway: ETT  Cuffed: yes   Successful intubation technique: direct laryngoscopy  Facilitating devices/methods: intubating stylet  Endotracheal tube insertion site: oral  Blade: Garth  Blade size: 3  ETT size (mm): 7.5  Cormack-Lehane Classification: grade I - full view of glottis  Placement verified by: chest auscultation, capnometry and palpation of cuff   Measured from: lips  ETT/EBT  to lips (cm): 21  Number of attempts at approach: 1  Assessment: lips, teeth, and gum same as pre-op and atraumatic intubation

## 2022-08-18 NOTE — ANESTHESIA PREPROCEDURE EVALUATION
Anesthesia Evaluation     no history of anesthetic complications:  NPO Solid Status: > 8 hours  NPO Liquid Status: > 8 hours           Airway   Mallampati: II  TM distance: >3 FB  Neck ROM: full  No difficulty expected  Dental    (+) poor dentition        Pulmonary - normal exam   Cardiovascular - normal exam    (+) hypertension,       Neuro/Psych  GI/Hepatic/Renal/Endo    (+)   diabetes mellitus,     Musculoskeletal     Abdominal  - normal exam    Bowel sounds: normal.   Substance History      OB/GYN          Other                        Anesthesia Plan    ASA 2     general with block     intravenous induction     Anesthetic plan, risks, benefits, and alternatives have been provided, discussed and informed consent has been obtained with: patient.        CODE STATUS:

## 2022-08-22 LAB — REF LAB TEST METHOD: NORMAL

## 2022-08-31 ENCOUNTER — OFFICE VISIT (OUTPATIENT)
Dept: SURGERY | Facility: CLINIC | Age: 42
End: 2022-08-31

## 2022-08-31 VITALS — HEIGHT: 74 IN | BODY MASS INDEX: 26.05 KG/M2 | WEIGHT: 203 LBS

## 2022-08-31 DIAGNOSIS — K80.20 CALCULUS OF GALLBLADDER WITHOUT CHOLECYSTITIS WITHOUT OBSTRUCTION: Primary | ICD-10-CM

## 2022-08-31 PROCEDURE — 99024 POSTOP FOLLOW-UP VISIT: CPT | Performed by: SURGERY

## 2022-09-01 NOTE — PROGRESS NOTES
Subjective   Bry Rodriguez is a 42 y.o. male  is here today for follow-up.         Bry Rodriguez is a 42 y.o. male here for followup after cholecystectomy.  The patient is doing well and tolerating diet.  Their incisions are healing well.  No complaints reported.    Pathology consistent with chronic cholecystitis    Physical Exam:  NAD, AOx3  RRR  CTAB  S/appropriately tender/incisions healing well          Diagnoses and all orders for this visit:    1. Calculus of gallbladder without cholecystitis without obstruction (Primary)        Assessment     42 y.o. male s/p cholecystectomy secondary to chronic cholecystitis.  Follow-up as needed.

## 2023-04-25 DIAGNOSIS — M25.512 LEFT SHOULDER PAIN, UNSPECIFIED CHRONICITY: Primary | ICD-10-CM

## 2023-11-30 ENCOUNTER — OFFICE VISIT (OUTPATIENT)
Dept: CARDIOLOGY | Facility: CLINIC | Age: 43
End: 2023-11-30
Payer: COMMERCIAL

## 2023-11-30 VITALS
OXYGEN SATURATION: 98 % | SYSTOLIC BLOOD PRESSURE: 138 MMHG | BODY MASS INDEX: 28.42 KG/M2 | HEIGHT: 74 IN | DIASTOLIC BLOOD PRESSURE: 88 MMHG | RESPIRATION RATE: 16 BRPM | HEART RATE: 68 BPM | WEIGHT: 221.4 LBS

## 2023-11-30 DIAGNOSIS — F10.10 ETOH ABUSE: ICD-10-CM

## 2023-11-30 DIAGNOSIS — R06.02 SHORTNESS OF BREATH: Primary | ICD-10-CM

## 2023-11-30 DIAGNOSIS — I10 ESSENTIAL HYPERTENSION: ICD-10-CM

## 2023-11-30 DIAGNOSIS — Z82.49 FAMILY HISTORY OF CORONARY ARTERY DISEASE: ICD-10-CM

## 2023-11-30 DIAGNOSIS — E11.9 TYPE 2 DIABETES MELLITUS WITHOUT COMPLICATION, WITHOUT LONG-TERM CURRENT USE OF INSULIN: ICD-10-CM

## 2023-11-30 DIAGNOSIS — R07.2 PRECORDIAL CHEST PAIN: ICD-10-CM

## 2023-11-30 DIAGNOSIS — R00.2 PALPITATIONS: ICD-10-CM

## 2023-11-30 DIAGNOSIS — E78.5 DYSLIPIDEMIA: ICD-10-CM

## 2023-11-30 RX ORDER — OMEGA-3-ACID ETHYL ESTERS 1 G/1
2 CAPSULE, LIQUID FILLED ORAL 2 TIMES DAILY
Qty: 180 CAPSULE | Refills: 1 | Status: SHIPPED | OUTPATIENT
Start: 2023-11-30

## 2023-11-30 RX ORDER — LANOLIN ALCOHOL/MO/W.PET/CERES
100 CREAM (GRAM) TOPICAL DAILY
Qty: 90 TABLET | Refills: 0 | Status: SHIPPED | OUTPATIENT
Start: 2023-11-30

## 2023-11-30 NOTE — PROGRESS NOTES
Subjective   Initial consultation, palpitations, shortness of breath  Bry Rodriguez is a 43 y.o. male who presents to day for Palpitations (Flutters,skips), Shortness of Breath (Routine activity), and Med Management (verbal).    CHIEF COMPLIANT  Chief Complaint   Patient presents with    Palpitations     Flutters,skips    Shortness of Breath     Routine activity    Med Management     verbal       Active Problems:  Problem List Items Addressed This Visit          Cardiac and Vasculature    Essential hypertension    Palpitations    Relevant Orders    Adult Transthoracic Echo Complete w/ Color, Spectral and Contrast if necessary per protocol    Cardiac Event Monitor    Precordial chest pain    Relevant Orders    Stress Test With Myocardial Perfusion One Day    Adult Transthoracic Echo Complete w/ Color, Spectral and Contrast if necessary per protocol    Dyslipidemia    Relevant Orders    Lipid Panel    Comprehensive Metabolic Panel       Endocrine and Metabolic    Type 2 diabetes mellitus without complication, without long-term current use of insulin    Relevant Medications    metFORMIN (GLUCOPHAGE) 500 MG tablet    Other Relevant Orders    Hemoglobin A1c       Family History    Family history of coronary artery disease       Mental Health    ETOH abuse    Relevant Medications    thiamine (VITAMIN B1) 100 MG tablet       Pulmonary and Pneumonias    Shortness of breath - Primary    Relevant Orders    Stress Test With Myocardial Perfusion One Day    Adult Transthoracic Echo Complete w/ Color, Spectral and Contrast if necessary per protocol       HPI  HPI  Patient has been having episodes of palpitations for many years when the heart skips and sometimes flutters when feels it is running not associated with other symptoms but over the last few months he has also been noticing that he is getting more more short of breath progressively for things which he is to do normally including things like tying his shoes or taking a  "shower he has very rare episodes of chest discomfort maybe less than once a month when it happens not related to food or exertion and associated with some headaches he has also some intermittent lower extremity edema and history of hypertension as well as diabetes he is not sure about his cholesterol he is not on a statin, his never smoked family history wise his father and mother both had heart attacks his father had a first heart attack at age 64 his mother is age of 48  PRIOR MEDS  Current Outpatient Medications on File Prior to Visit   Medication Sig Dispense Refill    acetaminophen (TYLENOL) 500 MG tablet Take 2 tablets by mouth Every 6 (Six) Hours As Needed for Mild Pain.      ibuprofen (ADVIL,MOTRIN) 600 MG tablet Take 1 tablet by mouth Every 6 (Six) Hours As Needed for Mild Pain . 30 tablet 0    metFORMIN (GLUCOPHAGE) 500 MG tablet Take 1 tablet by mouth 2 (Two) Times a Day With Meals.      metoprolol tartrate (LOPRESSOR) 50 MG tablet Take 1 tablet by mouth Every 12 (Twelve) Hours. 60 tablet 0    [DISCONTINUED] acetaminophen (TYLENOL) 325 MG tablet Take 2 tablets by mouth Every 4 (Four) Hours As Needed for Mild Pain . 30 tablet 0    [DISCONTINUED] pantoprazole (PROTONIX) 40 MG EC tablet TAKE 1 TABLET BY MOUTH DAILY FOR GASTRITIS      [DISCONTINUED] traMADol (ULTRAM) 50 MG tablet Take 1 tablet by mouth Every 8 (Eight) Hours As Needed for Moderate Pain . 8 tablet 0     No current facility-administered medications on file prior to visit.       ALLERGIES  Patient has no known allergies.    HISTORY  Past Medical History:   Diagnosis Date    Diabetes mellitus     Gall stones     Hemochromatosis     Hypertension     Palpitations        Social History     Socioeconomic History    Marital status:    Tobacco Use    Smoking status: Never    Smokeless tobacco: Never   Vaping Use    Vaping Use: Never used   Substance and Sexual Activity    Alcohol use: Yes     Comment: \"drinks socially\"    Drug use: No    Sexual " "activity: Defer       Family History   Problem Relation Age of Onset    Heart attack Mother 48    Heart attack Father 64    Heart disease Maternal Grandmother     Heart disease Maternal Grandfather        Review of Systems   Respiratory:  Positive for chest tightness and shortness of breath. Negative for apnea, cough, choking and wheezing.    Cardiovascular:  Positive for palpitations and leg swelling. Negative for chest pain.       Objective     VITALS: /88 (BP Location: Right arm, Cuff Size: Adult)   Pulse 68   Resp 16   Ht 188 cm (74\")   Wt 100 kg (221 lb 6.4 oz)   SpO2 98%   BMI 28.43 kg/m²     LABS:   Lab Results (most recent)       None            IMAGING:   No Images in the past 120 days found..    EXAM:  Physical Exam  Constitutional:       Appearance: He is well-developed.   HENT:      Head: Normocephalic and atraumatic.   Eyes:      Pupils: Pupils are equal, round, and reactive to light.   Neck:      Thyroid: No thyromegaly.      Vascular: No JVD.   Cardiovascular:      Rate and Rhythm: Normal rate and regular rhythm.      Chest Wall: PMI is not displaced.      Pulses: Normal pulses.      Heart sounds: Normal heart sounds, S1 normal and S2 normal. No murmur heard.     No friction rub. No gallop.   Pulmonary:      Effort: Pulmonary effort is normal. No respiratory distress.      Breath sounds: Normal breath sounds. No stridor. No wheezing or rales.   Chest:      Chest wall: No tenderness.   Abdominal:      General: Bowel sounds are normal. There is no distension.      Palpations: Abdomen is soft. There is no mass.      Tenderness: There is no abdominal tenderness. There is no guarding or rebound.   Musculoskeletal:      Cervical back: Neck supple. No edema.   Skin:     General: Skin is warm and dry.      Coloration: Skin is not pale.      Findings: No erythema or rash.   Neurological:      Mental Status: He is alert and oriented to person, place, and time.      Cranial Nerves: No cranial nerve " deficit.      Coordination: Coordination normal.   Psychiatric:         Behavior: Behavior normal.         Procedure     ECG 12 Lead    Date/Time: 11/30/2023 9:56 AM  Performed by: Ruy Nowak MD    Authorized by: Ruy Nowak MD      EKG: Normal sinus rhythm with atrial fibrillation change compared with the EKG on 8/15/2022 no significant change       Assessment & Plan     Diagnoses and all orders for this visit:    1. Shortness of breath (Primary)  -     Stress Test With Myocardial Perfusion One Day; Future  -     Adult Transthoracic Echo Complete w/ Color, Spectral and Contrast if necessary per protocol; Future    2. Palpitations  -     Adult Transthoracic Echo Complete w/ Color, Spectral and Contrast if necessary per protocol; Future  -     Cardiac Event Monitor; Future    3. Precordial chest pain  -     Stress Test With Myocardial Perfusion One Day; Future  -     Adult Transthoracic Echo Complete w/ Color, Spectral and Contrast if necessary per protocol; Future    4. Essential hypertension    5. Type 2 diabetes mellitus without complication, without long-term current use of insulin  -     Hemoglobin A1c; Future    6. Dyslipidemia  -     Lipid Panel; Future  -     Comprehensive Metabolic Panel; Future    7. Family history of coronary artery disease    8. ETOH abuse  -     thiamine (VITAMIN B1) 100 MG tablet; Take 1 tablet by mouth Daily.  Dispense: 90 tablet; Refill: 0    Other orders  -     ECG 12 Lead  -     omega-3 acid ethyl esters (LOVAZA) 1 g capsule; Take 2 capsules by mouth 2 (Two) Times a Day.  Dispense: 180 capsule; Refill: 1    1.  With new onset shortness of breath he is diabetic with which is poorly controlled as well as hyperlipidemia and strong family history of coronary artery disease this could be angina equivalent I am going to proceed with stress testing for assessment of ischemia also get an echocardiogram to assess cardiac function wall motion and valve morphology  2.  I reviewed his  lipid profile from July 20 at his primary care physician that showed very high triglycerides of 891 low HDL of 87 his hemoglobin A1c is 6.7 the LDL could not be calculated going to start him on Vascepa and also I strongly advised him to abstain from alcohol and also he needs more tight sugar control I will repeat his lipids prior to his next visit especially I would like to know what his LDL like especially with a strong family history  3.  His blood pressure is borderline elevated at the moment I am going to monitor it for now  4.  He has been measuring on average for measures of alcohol every night advised him to abstain and will start him on thiamine 100 mg/day  5.  Get an event monitor for assessment of arrhythmia    Return After sttress test.                 MEDS ORDERED DURING VISIT:  New Medications Ordered This Visit   Medications    thiamine (VITAMIN B1) 100 MG tablet     Sig: Take 1 tablet by mouth Daily.     Dispense:  90 tablet     Refill:  0    omega-3 acid ethyl esters (LOVAZA) 1 g capsule     Sig: Take 2 capsules by mouth 2 (Two) Times a Day.     Dispense:  180 capsule     Refill:  1       As always, Nallely Poon FNP  I appreciate very much the opportunity to participate in the cardiovascular care of your patients. Please do not hesitate to call me with any questions with regards to Bry Rodriguez evaluation and management.         This document has been electronically signed by Ruy Nowak MD  November 30, 2023 10:39 EST    This note is dictated utilizing voice recognition software.

## 2024-11-18 ENCOUNTER — CONSULT (OUTPATIENT)
Dept: ONCOLOGY | Facility: CLINIC | Age: 44
End: 2024-11-18
Payer: COMMERCIAL

## 2024-11-18 ENCOUNTER — LAB (OUTPATIENT)
Dept: ONCOLOGY | Facility: CLINIC | Age: 44
End: 2024-11-18
Payer: COMMERCIAL

## 2024-11-18 VITALS
BODY MASS INDEX: 26.69 KG/M2 | HEART RATE: 92 BPM | WEIGHT: 208 LBS | RESPIRATION RATE: 20 BRPM | OXYGEN SATURATION: 97 % | DIASTOLIC BLOOD PRESSURE: 94 MMHG | HEIGHT: 74 IN | TEMPERATURE: 98 F | SYSTOLIC BLOOD PRESSURE: 153 MMHG

## 2024-11-18 DIAGNOSIS — E78.5 DYSLIPIDEMIA: ICD-10-CM

## 2024-11-18 DIAGNOSIS — R16.1 SPLEEN ENLARGED: ICD-10-CM

## 2024-11-18 DIAGNOSIS — E83.110 HEREDITARY HEMOCHROMATOSIS: Primary | ICD-10-CM

## 2024-11-18 DIAGNOSIS — E83.110 HEREDITARY HEMOCHROMATOSIS: ICD-10-CM

## 2024-11-18 DIAGNOSIS — R79.89 ELEVATED FERRITIN: ICD-10-CM

## 2024-11-18 DIAGNOSIS — R16.2 HEPATOSPLENOMEGALY: ICD-10-CM

## 2024-11-18 DIAGNOSIS — R79.89 ELEVATED LFTS: ICD-10-CM

## 2024-11-18 DIAGNOSIS — E11.9 TYPE 2 DIABETES MELLITUS WITHOUT COMPLICATION, WITHOUT LONG-TERM CURRENT USE OF INSULIN: ICD-10-CM

## 2024-11-18 LAB
ALBUMIN SERPL-MCNC: 4.9 G/DL (ref 3.5–5.2)
ALBUMIN/GLOB SERPL: 1.5 G/DL
ALP SERPL-CCNC: 54 U/L (ref 39–117)
ALT SERPL W P-5'-P-CCNC: 55 U/L (ref 1–41)
ANION GAP SERPL CALCULATED.3IONS-SCNC: 13.9 MMOL/L (ref 5–15)
AST SERPL-CCNC: 52 U/L (ref 1–40)
BASOPHILS # BLD AUTO: 0.07 10*3/MM3 (ref 0–0.2)
BASOPHILS NFR BLD AUTO: 0.8 % (ref 0–1.5)
BILIRUB SERPL-MCNC: 0.8 MG/DL (ref 0–1.2)
BUN SERPL-MCNC: 5 MG/DL (ref 6–20)
BUN/CREAT SERPL: 5.1 (ref 7–25)
CALCIUM SPEC-SCNC: 10.3 MG/DL (ref 8.6–10.5)
CHLORIDE SERPL-SCNC: 100 MMOL/L (ref 98–107)
CHOLEST SERPL-MCNC: 197 MG/DL (ref 0–200)
CO2 SERPL-SCNC: 26.1 MMOL/L (ref 22–29)
CREAT SERPL-MCNC: 0.98 MG/DL (ref 0.76–1.27)
CRP SERPL-MCNC: 0.32 MG/DL (ref 0–0.5)
DEPRECATED RDW RBC AUTO: 41.1 FL (ref 37–54)
EGFRCR SERPLBLD CKD-EPI 2021: 96.9 ML/MIN/1.73
EOSINOPHIL # BLD AUTO: 0.13 10*3/MM3 (ref 0–0.4)
EOSINOPHIL NFR BLD AUTO: 1.5 % (ref 0.3–6.2)
ERYTHROCYTE [DISTWIDTH] IN BLOOD BY AUTOMATED COUNT: 11.7 % (ref 12.3–15.4)
ERYTHROCYTE [SEDIMENTATION RATE] IN BLOOD: 1 MM/HR (ref 0–15)
FERRITIN SERPL-MCNC: 1089 NG/ML (ref 30–400)
GLOBULIN UR ELPH-MCNC: 3.2 GM/DL
GLUCOSE SERPL-MCNC: 127 MG/DL (ref 65–99)
HAV IGM SERPL QL IA: NORMAL
HBA1C MFR BLD: 5.7 % (ref 4.8–5.6)
HBV CORE IGM SERPL QL IA: NORMAL
HBV SURFACE AG SERPL QL IA: NORMAL
HCT VFR BLD AUTO: 47.2 % (ref 37.5–51)
HCV AB SER QL: NORMAL
HDLC SERPL-MCNC: 37 MG/DL (ref 40–60)
HGB BLD-MCNC: 17.2 G/DL (ref 13–17.7)
HIV 1+2 AB+HIV1 P24 AG SERPL QL IA: NORMAL
IMM GRANULOCYTES # BLD AUTO: 0.03 10*3/MM3 (ref 0–0.05)
IMM GRANULOCYTES NFR BLD AUTO: 0.3 % (ref 0–0.5)
IRON 24H UR-MRATE: 118 MCG/DL (ref 59–158)
IRON SATN MFR SERPL: 30 % (ref 20–50)
LDLC SERPL CALC-MCNC: 89 MG/DL (ref 0–100)
LDLC/HDLC SERPL: 1.97 {RATIO}
LYMPHOCYTES # BLD AUTO: 1.86 10*3/MM3 (ref 0.7–3.1)
LYMPHOCYTES NFR BLD AUTO: 21.6 % (ref 19.6–45.3)
MCH RBC QN AUTO: 35 PG (ref 26.6–33)
MCHC RBC AUTO-ENTMCNC: 36.4 G/DL (ref 31.5–35.7)
MCV RBC AUTO: 96.1 FL (ref 79–97)
MONOCYTES # BLD AUTO: 1.04 10*3/MM3 (ref 0.1–0.9)
MONOCYTES NFR BLD AUTO: 12.1 % (ref 5–12)
NEUTROPHILS NFR BLD AUTO: 5.48 10*3/MM3 (ref 1.7–7)
NEUTROPHILS NFR BLD AUTO: 63.7 % (ref 42.7–76)
NRBC BLD AUTO-RTO: 0 /100 WBC (ref 0–0.2)
PLATELET # BLD AUTO: 174 10*3/MM3 (ref 140–450)
PMV BLD AUTO: 9.6 FL (ref 6–12)
POTASSIUM SERPL-SCNC: 4.1 MMOL/L (ref 3.5–5.2)
PROT SERPL-MCNC: 8.1 G/DL (ref 6–8.5)
RBC # BLD AUTO: 4.91 10*6/MM3 (ref 4.14–5.8)
SODIUM SERPL-SCNC: 140 MMOL/L (ref 136–145)
TIBC SERPL-MCNC: 399 MCG/DL (ref 298–536)
TRANSFERRIN SERPL-MCNC: 268 MG/DL (ref 200–360)
TRIGL SERPL-MCNC: 436 MG/DL (ref 0–150)
TSH SERPL DL<=0.05 MIU/L-ACNC: 1.33 UIU/ML (ref 0.27–4.2)
VLDLC SERPL-MCNC: 71 MG/DL (ref 5–40)
WBC NRBC COR # BLD AUTO: 8.61 10*3/MM3 (ref 3.4–10.8)

## 2024-11-18 PROCEDURE — 83540 ASSAY OF IRON: CPT

## 2024-11-18 PROCEDURE — 82607 VITAMIN B-12: CPT

## 2024-11-18 PROCEDURE — 86665 EPSTEIN-BARR CAPSID VCA: CPT

## 2024-11-18 PROCEDURE — G0432 EIA HIV-1/HIV-2 SCREEN: HCPCS

## 2024-11-18 PROCEDURE — 3080F DIAST BP >= 90 MM HG: CPT

## 2024-11-18 PROCEDURE — 84630 ASSAY OF ZINC: CPT

## 2024-11-18 PROCEDURE — 1125F AMNT PAIN NOTED PAIN PRSNT: CPT

## 2024-11-18 PROCEDURE — 86140 C-REACTIVE PROTEIN: CPT

## 2024-11-18 PROCEDURE — 83036 HEMOGLOBIN GLYCOSYLATED A1C: CPT | Performed by: SPECIALIST

## 2024-11-18 PROCEDURE — 82746 ASSAY OF FOLIC ACID SERUM: CPT

## 2024-11-18 PROCEDURE — 3077F SYST BP >= 140 MM HG: CPT

## 2024-11-18 PROCEDURE — 80061 LIPID PANEL: CPT | Performed by: SPECIALIST

## 2024-11-18 PROCEDURE — 85652 RBC SED RATE AUTOMATED: CPT

## 2024-11-18 PROCEDURE — 86664 EPSTEIN-BARR NUCLEAR ANTIGEN: CPT

## 2024-11-18 PROCEDURE — 84466 ASSAY OF TRANSFERRIN: CPT

## 2024-11-18 PROCEDURE — 80050 GENERAL HEALTH PANEL: CPT

## 2024-11-18 PROCEDURE — 82728 ASSAY OF FERRITIN: CPT

## 2024-11-18 PROCEDURE — 86038 ANTINUCLEAR ANTIBODIES: CPT

## 2024-11-18 PROCEDURE — 82525 ASSAY OF COPPER: CPT

## 2024-11-18 PROCEDURE — 99205 OFFICE O/P NEW HI 60 MIN: CPT

## 2024-11-18 PROCEDURE — 82105 ALPHA-FETOPROTEIN SERUM: CPT

## 2024-11-18 PROCEDURE — 80074 ACUTE HEPATITIS PANEL: CPT

## 2024-11-18 PROCEDURE — 86431 RHEUMATOID FACTOR QUANT: CPT

## 2024-11-18 RX ORDER — TIRZEPATIDE 2.5 MG/.5ML
2.5 INJECTION, SOLUTION SUBCUTANEOUS WEEKLY
COMMUNITY

## 2024-11-18 NOTE — PROGRESS NOTES
Venipuncture Blood Specimen Collection  Venipuncture performed in left arm by Karolyn Mijares MA with good hemostasis. Patient tolerated the procedure well without complications.   11/18/24   Karolyn Mijares MA     Dr Pierre parent, residents, nursing, consultant parent, residents, nursing, consultant

## 2024-11-18 NOTE — PROGRESS NOTES
DATE OF CONSULTATION:  11/19/2024    REASON FOR REFERRAL: Hereditary hemochromatosis, elevated ferritin    REFERRING PHYSICIAN:  KRISTY Bryant    CHIEF COMPLAINT:  LUQ pain    HISTORY OF PRESENT ILLNESS:   Bry Rodriguez is a very pleasant 44 y.o. male who is being seen today at the request of KRISTY Bryant for evaluation and treatment of elevated ferritin/hereditary hemochromatosis.  Patient does have a history of diabetes that is currently being managed by his PCP.  Patient reports today that he follows with his PCP only as needed, with labs as needed as well.  However, he has been following with Viviana Villarreal more often who follows him for GI issues, but also has been performing his therapeutic phlebotomies.  Prior to that he was seeing [place] [facility] for therapeutic phlebotomy.  His regimen has been every 2 weekly phlebotomies, his last phlebotomy being in September 2024.  We are awaiting office visit notes and workup from patient's previous hemochromatosis history and treatment.  Patient reports today that he has been receiving phlebotomies for about a 1-1.5 years  Recently had a CT of the abdomen pelvis on 5/2/2024 which revealed hepatosplenomegaly and fatty liver.  Of note he also has elevated LFTs and will be seeing [facility] in February.  He is complaining today of left upper quadrant pain related to his enlarged spleen.  At present, he denies any abnormal bruising or bleeding, chronic aches or pains, headaches, or dizziness.  He does report EtOH use.  He states today that he drinks 6 or more beers daily.  He does report daily NSAID use with 800 mg ibuprofen, as well as daily Tylenol use.  He reports taking these for the pain in his left upper quadrant.  Denies aspirin use.  Denies any known family history of hemochromatosis.  He does report eating a diet rich in iron and taking a daily multivitamin.  Denies any iron supplementation.  Upon review of available lab work patient  "was diagnosed with hereditary hemochromatosis- [id number] (heterozygous) on 7/28/2022.  Recent CBCs between 7/31/2022-10/2/2024 showed mostly normalized H&H, WBCs and platelets.  Ferritin on 10/2/2024 was 182.57.  Serum iron normal with mildly elevated iron saturation at 60%.  No other labs available for review today.  No other new or worsening complaints today.        PAST MEDICAL HISTORY:  Past Medical History:   Diagnosis Date    Colon polyps     Diabetes mellitus     Gall stones     Hemochromatosis     Hypertension     Palpitations        PAST SURGICAL HISTORY:  Past Surgical History:   Procedure Laterality Date    CHOLECYSTECTOMY N/A 8/18/2022    Procedure: CHOLECYSTECTOMY LAPAROSCOPIC WITH DAVINCI ROBOT;  Surgeon: Amandeep Rogers MD;  Location: [facility];  Service: Robotics - DaVinci;  Laterality: N/A;       FAMILY HISTORY:  Family History   Problem Relation Age of Onset    Heart attack Mother 48    Heart attack Father 64    Heart disease Maternal Grandmother     Heart disease Maternal Grandfather        SOCIAL HISTORY:  Social History     Socioeconomic History    Marital status:    Tobacco Use    Smoking status: Never    Smokeless tobacco: Never   Vaping Use    Vaping status: Never Used   Substance and Sexual Activity    Alcohol use: Yes     Alcohol/week: 42.0 standard drinks of alcohol     Types: 42 Cans of beer per week     Comment: \"drinks socially\"    Drug use: No    Sexual activity: Defer       MEDICATIONS:  The current medication list was reviewed in the EMR    Current Outpatient Medications:     ibuprofen (ADVIL,MOTRIN) 600 MG tablet, Take 1 tablet by mouth Every 6 (Six) Hours As Needed for Mild Pain ., Disp: 30 tablet, Rfl: 0    linaclotide (LINZESS) 290 MCG capsule capsule, Take 1 capsule by mouth Every Morning Before Breakfast., Disp: , Rfl:     metFORMIN (GLUCOPHAGE) 500 MG tablet, Take 1 tablet by mouth 2 (Two) Times a Day With Meals., Disp: , Rfl:     metoprolol tartrate (LOPRESSOR) " "50 MG tablet, Take 1 tablet by mouth Every 12 (Twelve) Hours., Disp: 60 tablet, Rfl: 0    omeprazole (priLOSEC) 20 MG capsule, Take 1 capsule by mouth Daily., Disp: , Rfl:     Tirzepatide (Mounjaro) 2.5 MG/0.5ML solution auto-injector, Inject 2.5 mg under the skin into the appropriate area as directed 1 (One) Time Per Week., Disp: , Rfl:     acetaminophen (TYLENOL) 500 MG tablet, Take 2 tablets by mouth Every 6 (Six) Hours As Needed for Mild Pain. (Patient not taking: Reported on 11/18/2024), Disp: , Rfl:     omega-3 acid ethyl esters (LOVAZA) 1 g capsule, Take 2 capsules by mouth 2 (Two) Times a Day. (Patient not taking: Reported on 11/18/2024), Disp: 180 capsule, Rfl: 1    thiamine (VITAMIN B1) 100 MG tablet, Take 1 tablet by mouth Daily. (Patient not taking: Reported on 11/18/2024), Disp: 90 tablet, Rfl: 0    ALLERGIES:  No Known Allergies      REVIEW OF SYSTEMS:    A comprehensive 14 point review of systems was performed.  Significant findings as mentioned above.  All other systems reviewed and are negative.      ECOG score: 0           Physical Exam   Vital Signs: /94   Pulse 92   Temp 98 °F (36.7 °C) (Temporal)   Resp 20   Ht 188 cm (74\")   Wt 94.3 kg (208 lb)   SpO2 97%   BMI 26.71 kg/m²      General: Well developed, well nourished, alert and oriented x 3, in no acute distress.   Head: ATNC   Eyes: PERRL, No evidence of conjunctivitis.   Nose: No nasal discharge.   Mouth: Oral mucosal membranes moist. No oral ulceration or hemorrhages.   Neck: Neck supple. No thyromegaly. No JVD.   Lungs: Clear in all fields to A&P. No wheezing.   Heart: S1, S2. Regular rate and rhythm. No murmurs, rubs, or gallops.   Abdomen: Soft. Bowel sounds are normoactive. LUQ tender with palpation. No hepatosplenomegaly can be appreciated.   Extremities: No cyanosis or edema. Peripheral pulses palpable and equal bilaterally.   Integumentary: No rash, sores, erythema or nodules. No blistering, bruising, or dry skin. "   Neurologic: Grossly non-focal exam    Pain Score:  Pain Score    24 1249   PainSc:   5   PainLoc: Abdomen       PHQ-Score Total:  PHQ-9 Total Score:         PATHOLOGY:        ENDOSCOPY:        IMAGIN24      Previous labs:     10/2/24        7/8/24        7/31/22            7/28/22        10/4/16            RECENT LABS:  Lab Results   Component Value Date    WBC 8.61 2024    HGB 17.2 2024    HCT 47.2 2024    MCV 96.1 2024    RDW 11.7 (L) 2024     2024    NEUTRORELPCT 63.7 2024    LYMPHORELPCT 21.6 2024    MONORELPCT 12.1 (H) 2024    EOSRELPCT 1.5 2024    BASORELPCT 0.8 2024    NEUTROABS 5.48 2024    LYMPHSABS 1.86 2024       Lab Results   Component Value Date     2024    K 4.1 2024    CO2 26.1 2024     2024    BUN 5 (L) 2024    CREATININE 0.98 2024    GLUCOSE 127 (H) 2024    CALCIUM 10.3 2024    ALKPHOS 54 2024    AST 52 (H) 2024    ALT 55 (H) 2024    BILITOT 0.8 2024    ALBUMIN 4.9 2024    PROTEINTOT 8.1 2024     Hepatitis B Surface Ag  Non-Reactive Non-Reactive   Hep A IgM  Non-Reactive Non-Reactive   Hep B C IgM  Non-Reactive Non-Reactive   Hepatitis C Ab  Non-Reactive Non-Reactive     HIV-1/ HIV-2  Non-Reactive Non-Reactive     Lab Results   Component Value Date    FERRITIN 1,089.00 (H) 2024    IRON 118 2024    TIBC 399 2024    LABIRON 30 2024    [id number] 695 2024    FOLATE >20.00 2024     Rheumatoid Factor Quantitative  0.0 - 14.0 IU/mL <10.0     C-Reactive Protein  0.00 - 0.50 mg/dL 0.32     Sed Rate  0 - 15 mm/hr 1     TSH  0.270 - 4.200 uIU/mL 1.330     ALPHA-FETOPROTEIN  0 - 8.3 ng/mL 2.81       ASSESSMENT & PLAN:  Bry Rodriguez is a very pleasant 44 y.o. male with    Hereditary hemochromatosis, ([id number] heterozygous)   Elevated ferritin  -Upon review of available lab  work patient was diagnosed with hereditary hemochromatosis- [id number] (heterozygous) on 7/28/2022.  Recent CBCs between 7/31/2022-10/2/2024 showed mostly normalized H&H, WBCs and platelets.  Ferritin on 10/2/2024 was 182.57.  Serum iron normal with mildly elevated iron saturation at 60%.   -Patient has followed with hematology in the past and has been getting regular phlebotomies for about a 1-1.5 years, his last phlebotomy being in September 2024.  -Recent CT A/P on  5/2/2024 revealed hepatosplenomegaly and fatty liver.  -Denies abnormal bruising or bleeding, chronic aches or pains, headaches, or dizziness.  Denies aspirin use.  Denies any known family history of hemochromatosis.    -Patient does have a history of diabetes that is being controlled by his PCP.  -He does report daily ETOH use, drinking ~6 or more beers daily.   -Reports daily NSAID use with 800 mg ibuprofen.  -Reports eating a diet rich in iron and taking a daily multivitamin.    -Obtained additional labs today to further evaluate including CBC, CMP, iron studies, ferritin, CRP, and ESR.  Also obtained hepatitis panel, HIV panel, vitamin B12, folate, ZACHARY, RF, copper, zinc, TSH, AFP and PBS.  -CBC again showed normalized counts.  Iron panel revealed normal iron (118) and iron saturation (30%) with elevated ferritin of 1,089 ng/mL.  Therefore, therapeutic phlebotomy is required and patient is agreeable to do this Wednesday of this week.  -Discussed with the patient that several issues could be contributing to his elevated ferritin level including his diagnosis of hereditary hemochromatosis, metabolic syndrome (discussed importance of a healthy diet, exercise, blood sugar and cholesterol management), as well as a diet rich in iron.  -We discussed that he should avoid dietary and nutritional supplements that contain iron or vitamin C (written education provided on an iron rich diet to avoid). He also needs to avoid alcohol and uncooked seafood as  patients with HH are at risk for certain infections with bacteria that thrive on the increased plasma iron concentrations (ex: Listeria, Yersinia, Vibrio).  -Patient with HH are at risk for cirrhosis and there by hepatocellular carcinoma and should be screened on a periodic basis.  Explained the importance of following up with GI.  AFP normal today.  -Patient understands that she should have routine diabetes, thyroid, and osteoporosis testing.  -Will follow up in 3 months with repeat labs (CBC, CMP, Iron panel, Ferritin). In the meantime, he will require [id number] ferritin check, with PRN phlebotomy until I see him back in 3 months.  In the interim we will also follow up with above pending lab work. Recommended that he work on the issues above which is contributing in his case. He knows to avoid iron supplements, ETOH, MVI and iron rich foods in excess.      Elevated LFTs  -Recently had a CT of the abdomen pelvis on 5/2/2024 which revealed hepatosplenomegaly and fatty liver.  Of note he also has elevated LFTs and will be seeing [facility] in February.  Instructed to keep this appointment.   -LFTs elevated again today with ALT of 55 and AST of 52.  -Obtained AFP today which was normal.  HIV and hepatitis panels were negative.  -Fatty liver diet information provided.   -Advised against frequent Tylenol use.   -Instructed to cut back on EtOH use, in hopes of completely stopping to avoid any further liver damage from this.            ACO / NINA/Other  Quality measures  -  Bry Rodriguez did not receive 2024 flu vaccine.  This was recommended.  -  Bry Rodriguez reports a pain score of 5.  Given his pain assessment as noted, treatment options were discussed and the following options were decided upon as a follow-up plan to address the patient's pain: use of non-medical modalities (ice, heat, stretching and/or behavior modifications).  -  Current outpatient and discharge medications have been reconciled for the  patient.  Reviewed by: MINOO Claire        The patient was in agreement with the plan and all questions were answered to his satisfaction.     Thank you so much for allowing us to participate in the care of Bry Rodriguez . Please do not hesitate to contact us with any questions or concerns.     A total of 60 minutes were spent coordinating this patient’s care in clinic today; more than 50% of this time was face-to-face with the patient, reviewing his interim medical history and counseling on the current treatment and followup plan. All questions were answered to his satisfaction.      Electronically Signed by: MINOO Claire , November 19, 2024 10:46 EST       CC:   KRISTY Bryant Amanda Renee, FNP

## 2024-11-19 ENCOUNTER — TRANSCRIBE ORDERS (OUTPATIENT)
Dept: ONCOLOGY | Facility: HOSPITAL | Age: 44
End: 2024-11-19
Payer: COMMERCIAL

## 2024-11-19 DIAGNOSIS — E83.110 HEREDITARY HEMOCHROMATOSIS: Primary | ICD-10-CM

## 2024-11-19 LAB
ALPHA-FETOPROTEIN: 2.81 NG/ML (ref 0–8.3)
ANA SER QL: NEGATIVE
CHROMATIN AB SERPL-ACNC: <10 IU/ML (ref 0–14)
EBV NA IGG SER IA-ACNC: 328 U/ML (ref 0–17.9)
EBV VCA IGG SER IA-ACNC: 497 U/ML (ref 0–17.9)
EBV VCA IGM SER IA-ACNC: <36 U/ML (ref 0–35.9)
FOLATE SERPL-MCNC: >20 NG/ML (ref 4.78–24.2)
SERVICE CMNT-IMP: ABNORMAL
VIT B12 BLD-MCNC: 695 PG/ML (ref 211–946)

## 2024-11-20 ENCOUNTER — LAB (OUTPATIENT)
Dept: ONCOLOGY | Facility: HOSPITAL | Age: 44
End: 2024-11-20
Payer: COMMERCIAL

## 2024-11-20 VITALS
RESPIRATION RATE: 18 BRPM | DIASTOLIC BLOOD PRESSURE: 98 MMHG | OXYGEN SATURATION: 98 % | TEMPERATURE: 97.3 F | HEART RATE: 77 BPM | SYSTOLIC BLOOD PRESSURE: 148 MMHG

## 2024-11-20 DIAGNOSIS — E83.110 HEREDITARY HEMOCHROMATOSIS: ICD-10-CM

## 2024-11-20 LAB
CMV DNA SERPL NAA+PROBE-ACNC: NEGATIVE IU/ML
CMV DNA SERPL NAA+PROBE-LOG IU: NORMAL LOG10 IU/ML
Lab: NORMAL
POST-BLOOD PRESSURE: NORMAL MMHG
PRE-BLOOD PRESSURE: NORMAL MMHG
PRE-HCT: 47.2 %
PRE-HGB: 17.2 G/DL
PRE-PULSE: 75 BPM
REF LAB TEST METHOD: NORMAL
VOLUME COLLECTED: 500 ML
ZINC SERPL-MCNC: 59 UG/DL (ref 44–115)

## 2024-11-20 PROCEDURE — 99195 PHLEBOTOMY: CPT

## 2024-11-20 NOTE — PROGRESS NOTES
Pre-Phlebotomy  Vitals:    11/20/24 1403   BP: 148/98   Pulse: 77   Resp: 18   Temp: 97.3 °F (36.3 °C)   SpO2: 98%       Phlebotomy performed and 501 mL removed by phlebotomist    Post phlebotomy @ 1440  BP: 154/98  HR: 80    Patient denies any nausea, dizziness or lightheadedness. Patient ambulatory from department.

## 2024-11-21 LAB — COPPER SERPL-MCNC: 70 UG/DL (ref 69–132)

## 2024-12-04 ENCOUNTER — LAB (OUTPATIENT)
Dept: ONCOLOGY | Facility: HOSPITAL | Age: 44
End: 2024-12-04
Payer: COMMERCIAL

## 2024-12-04 VITALS
HEART RATE: 91 BPM | RESPIRATION RATE: 18 BRPM | OXYGEN SATURATION: 99 % | TEMPERATURE: 98.2 F | DIASTOLIC BLOOD PRESSURE: 99 MMHG | SYSTOLIC BLOOD PRESSURE: 157 MMHG

## 2024-12-04 DIAGNOSIS — E83.110 HEREDITARY HEMOCHROMATOSIS: ICD-10-CM

## 2024-12-04 LAB
BASOPHILS # BLD AUTO: 0.08 10*3/MM3 (ref 0–0.2)
BASOPHILS NFR BLD AUTO: 1.3 % (ref 0–1.5)
DEPRECATED RDW RBC AUTO: 41.7 FL (ref 37–54)
EOSINOPHIL # BLD AUTO: 0.28 10*3/MM3 (ref 0–0.4)
EOSINOPHIL NFR BLD AUTO: 4.7 % (ref 0.3–6.2)
ERYTHROCYTE [DISTWIDTH] IN BLOOD BY AUTOMATED COUNT: 12.1 % (ref 12.3–15.4)
FERRITIN SERPL-MCNC: 969.5 NG/ML (ref 30–400)
HCT VFR BLD AUTO: 45.3 % (ref 37.5–51)
HGB BLD-MCNC: 17 G/DL (ref 13–17.7)
IMM GRANULOCYTES # BLD AUTO: 0.04 10*3/MM3 (ref 0–0.05)
IMM GRANULOCYTES NFR BLD AUTO: 0.7 % (ref 0–0.5)
LYMPHOCYTES # BLD AUTO: 1.82 10*3/MM3 (ref 0.7–3.1)
LYMPHOCYTES NFR BLD AUTO: 30.4 % (ref 19.6–45.3)
Lab: NORMAL
MCH RBC QN AUTO: 35.5 PG (ref 26.6–33)
MCHC RBC AUTO-ENTMCNC: 37.5 G/DL (ref 31.5–35.7)
MCV RBC AUTO: 94.6 FL (ref 79–97)
MONOCYTES # BLD AUTO: 0.45 10*3/MM3 (ref 0.1–0.9)
MONOCYTES NFR BLD AUTO: 7.5 % (ref 5–12)
NEUTROPHILS NFR BLD AUTO: 3.32 10*3/MM3 (ref 1.7–7)
NEUTROPHILS NFR BLD AUTO: 55.4 % (ref 42.7–76)
NRBC BLD AUTO-RTO: 0 /100 WBC (ref 0–0.2)
PLATELET # BLD AUTO: 156 10*3/MM3 (ref 140–450)
PMV BLD AUTO: 9.6 FL (ref 6–12)
POST-BLOOD PRESSURE: NORMAL MMHG
PRE-BLOOD PRESSURE: NORMAL MMHG
PRE-HCT: 45.3 %
PRE-HGB: 17 G/DL
PRE-PULSE: 91 BPM
RBC # BLD AUTO: 4.79 10*6/MM3 (ref 4.14–5.8)
VOLUME COLLECTED: 502 ML
WBC NRBC COR # BLD AUTO: 5.99 10*3/MM3 (ref 3.4–10.8)

## 2024-12-04 PROCEDURE — 82728 ASSAY OF FERRITIN: CPT

## 2024-12-04 PROCEDURE — 99195 PHLEBOTOMY: CPT

## 2024-12-04 PROCEDURE — 85025 COMPLETE CBC W/AUTO DIFF WBC: CPT

## 2024-12-04 NOTE — PROGRESS NOTES
Pre-Phlebotomy  Vitals:    12/04/24 1356   BP: 157/99   Pulse: 91   Resp: 18   Temp: 98.2 °F (36.8 °C)   SpO2: 99%       Phlebotomy performed and 502 mL removed by phlebotomist    Post phlebotomy   BP: 144/94  HR: 94    Patient denies any nausea, dizziness or lightheadedness. Patient ambulatory from department.

## 2025-02-28 ENCOUNTER — OFFICE VISIT (OUTPATIENT)
Dept: GASTROENTEROLOGY | Facility: CLINIC | Age: 45
End: 2025-02-28
Payer: COMMERCIAL

## 2025-02-28 VITALS
DIASTOLIC BLOOD PRESSURE: 83 MMHG | WEIGHT: 201.2 LBS | RESPIRATION RATE: 18 BRPM | SYSTOLIC BLOOD PRESSURE: 131 MMHG | HEART RATE: 89 BPM | OXYGEN SATURATION: 98 % | BODY MASS INDEX: 25.82 KG/M2 | HEIGHT: 74 IN

## 2025-02-28 DIAGNOSIS — R10.12 LUQ PAIN: ICD-10-CM

## 2025-02-28 DIAGNOSIS — K58.1 IRRITABLE BOWEL SYNDROME WITH CONSTIPATION: ICD-10-CM

## 2025-02-28 DIAGNOSIS — R79.89 ELEVATED LFTS: Primary | ICD-10-CM

## 2025-02-28 DIAGNOSIS — K21.9 GASTROESOPHAGEAL REFLUX DISEASE, UNSPECIFIED WHETHER ESOPHAGITIS PRESENT: ICD-10-CM

## 2025-02-28 LAB
ALBUMIN SERPL-MCNC: 4.2 G/DL (ref 3.5–5.2)
ALBUMIN/GLOB SERPL: 1.1 G/DL
ALP SERPL-CCNC: 81 U/L (ref 39–117)
ALPHA1 GLOB MFR UR ELPH: 165 MG/DL (ref 90–200)
ALT SERPL W P-5'-P-CCNC: 18 U/L (ref 1–41)
ANION GAP SERPL CALCULATED.3IONS-SCNC: 14.9 MMOL/L (ref 5–15)
AST SERPL-CCNC: 20 U/L (ref 1–40)
BILIRUB SERPL-MCNC: 0.6 MG/DL (ref 0–1.2)
BUN SERPL-MCNC: 5 MG/DL (ref 6–20)
BUN/CREAT SERPL: 4.5 (ref 7–25)
CALCIUM SPEC-SCNC: 9.8 MG/DL (ref 8.6–10.5)
CERULOPLASMIN SERPL-MCNC: 17 MG/DL (ref 16–31)
CHLORIDE SERPL-SCNC: 101 MMOL/L (ref 98–107)
CO2 SERPL-SCNC: 24.1 MMOL/L (ref 22–29)
CREAT SERPL-MCNC: 1.1 MG/DL (ref 0.76–1.27)
EGFRCR SERPLBLD CKD-EPI 2021: 84.4 ML/MIN/1.73
GLOBULIN UR ELPH-MCNC: 3.8 GM/DL
GLUCOSE SERPL-MCNC: 219 MG/DL (ref 65–99)
HAV IGM SERPL QL IA: NORMAL
HBV CORE IGM SERPL QL IA: NORMAL
HBV SURFACE AG SERPL QL IA: NORMAL
HCV AB SER QL: NORMAL
INR PPP: 1.03 (ref 0.9–1.1)
POTASSIUM SERPL-SCNC: 3.7 MMOL/L (ref 3.5–5.2)
PROT SERPL-MCNC: 8 G/DL (ref 6–8.5)
PROTHROMBIN TIME: 13.7 SECONDS (ref 11.6–15.1)
SODIUM SERPL-SCNC: 140 MMOL/L (ref 136–145)

## 2025-02-28 PROCEDURE — 82172 ASSAY OF APOLIPOPROTEIN: CPT

## 2025-02-28 PROCEDURE — 82390 ASSAY OF CERULOPLASMIN: CPT

## 2025-02-28 PROCEDURE — 86038 ANTINUCLEAR ANTIBODIES: CPT

## 2025-02-28 PROCEDURE — 82784 ASSAY IGA/IGD/IGG/IGM EACH: CPT

## 2025-02-28 PROCEDURE — 80074 ACUTE HEPATITIS PANEL: CPT

## 2025-02-28 PROCEDURE — 83883 ASSAY NEPHELOMETRY NOT SPEC: CPT

## 2025-02-28 PROCEDURE — 83010 ASSAY OF HAPTOGLOBIN QUANT: CPT

## 2025-02-28 PROCEDURE — 80053 COMPREHEN METABOLIC PANEL: CPT

## 2025-02-28 PROCEDURE — 84478 ASSAY OF TRIGLYCERIDES: CPT

## 2025-02-28 PROCEDURE — 86376 MICROSOMAL ANTIBODY EACH: CPT

## 2025-02-28 PROCEDURE — 86258 DGP ANTIBODY EACH IG CLASS: CPT

## 2025-02-28 PROCEDURE — 86231 EMA EACH IG CLASS: CPT

## 2025-02-28 PROCEDURE — 86015 ACTIN ANTIBODY EACH: CPT

## 2025-02-28 PROCEDURE — 82103 ALPHA-1-ANTITRYPSIN TOTAL: CPT

## 2025-02-28 PROCEDURE — 82977 ASSAY OF GGT: CPT

## 2025-02-28 PROCEDURE — 82465 ASSAY BLD/SERUM CHOLESTEROL: CPT

## 2025-02-28 PROCEDURE — 86364 TISS TRNSGLTMNASE EA IG CLAS: CPT

## 2025-02-28 PROCEDURE — 85610 PROTHROMBIN TIME: CPT

## 2025-02-28 PROCEDURE — 86381 MITOCHONDRIAL ANTIBODY EACH: CPT

## 2025-02-28 RX ORDER — PANTOPRAZOLE SODIUM 40 MG/1
40 TABLET, DELAYED RELEASE ORAL DAILY
Qty: 60 TABLET | Refills: 2 | Status: SHIPPED | OUTPATIENT
Start: 2025-02-28

## 2025-02-28 RX ORDER — CALCIUM POLYCARBOPHIL 625 MG
625 TABLET ORAL DAILY
Qty: 60 TABLET | Refills: 5 | Status: SHIPPED | OUTPATIENT
Start: 2025-02-28

## 2025-02-28 NOTE — PROGRESS NOTES
Date of Consultation:  2/28/2025  Referring Physician: MINOO Gandhi    Chief Complaint  Establish Care    Bry Rodriguez is a 44 y.o. male who presents today to [facility] for \[facility]\"" Care.    HPI:   44-year-old male with PMH of hemochromatosis followed by hematology presents today for evaluation of left upper quadrant pain.  Patient states that he has had constant left upper quadrant pain for the last 2 years.  He describes this pain as an aching.  This is much worse with standing for long periods of time and can be alleviated with sitting down.  He notes dysphagia with eating hot dogs.  He feels that this becomes lodged in his midesophagus.  This can be alleviated with time and drinking lots of fluids.  He states that this happens <1 time per month.  In the past, patient reports drinking 6-8 beers daily.  He has been abstaining from alcohol for the last 3-4 months.  He has lost approximately 20 pounds but is on Mounjaro for diabetes.  He notes having 2-3 bowel movements per week that he rates as BSS 4-5.  He evacuates well for the majority of the time.  He denies excessive straining.  He had an EGD/colonoscopy performed at [facility] by Dr. Murguia in 2024.  This was notable for several colon polyps.  Records have been requested.  He denies unintentional weight loss, nausea, vomiting, hematemesis, GERD, melena, or hematochezia.    Of note, patient had a recent CT abdomen pelvis performed at [facility] on 5/2/2024.  This is notable for borderline splenomegaly, and fatty changes of the liver. CMP collected on 11/18/2024 was notable for ALT 55, AST 52.        Previous History:   Past Medical History:   Diagnosis Date    Colon polyps     Diabetes mellitus     Gall stones     Hemochromatosis     Hypertension     Palpitations       Past Surgical History:   Procedure Laterality Date    CHOLECYSTECTOMY N/A 8/18/2022    Procedure: CHOLECYSTECTOMY  "LAPAROSCOPIC WITH DAVINCI ROBOT;  Surgeon: Amandeep Rogers MD;  Location: [facility];  Service: Robotics - DaVinci;  Laterality: N/A;      Social History     Socioeconomic History    Marital status:    Tobacco Use    Smoking status: Never    Smokeless tobacco: Never   Vaping Use    Vaping status: Never Used   Substance and Sexual Activity    Alcohol use: Yes     Alcohol/week: 42.0 standard drinks of alcohol     Types: 42 Cans of beer per week     Comment: \"drinks socially\"    Drug use: No    Sexual activity: Defer        Current Medications:  Current Outpatient Medications   Medication Sig Dispense Refill    metoprolol tartrate (LOPRESSOR) 50 MG tablet Take 1 tablet by mouth Every 12 (Twelve) Hours. 60 tablet 0    Tirzepatide (Mounjaro) 2.5 MG/0.5ML solution auto-injector Inject 2.5 mg under the skin into the appropriate area as directed 1 (One) Time Per Week.      pantoprazole (Protonix) 40 MG EC tablet Take 1 tablet by mouth Daily. 60 tablet 2    polycarbophil (calcium polycarbophil) 625 MG tablet tablet Take 1 tablet by mouth Daily. 60 tablet 5    thiamine (VITAMIN B1) 100 MG tablet Take 1 tablet by mouth Daily. (Patient not taking: Reported on 2/28/2025) 90 tablet 0     No current facility-administered medications for this visit.       Allergies:   No Known Allergies    Vitals:   /83   Pulse 89   Resp 18   Ht 188 cm (74\")   Wt 91.3 kg (201 lb 3.2 oz)   SpO2 98%   BMI 25.83 kg/m²   Estimated body mass index is 25.83 kg/m² as calculated from the following:    Height as of this encounter: 188 cm (74\").    Weight as of this encounter: 91.3 kg (201 lb 3.2 oz).    Bry Rodriguez  reports that he has never smoked. He has never used smokeless tobacco. I have educated him on the risk of diseases from using tobacco products such as cancer, COPD, and heart disease.     ROS:   Review of Systems   Constitutional: Negative.    HENT:  Positive for trouble swallowing.    Respiratory: Negative.   "   Cardiovascular: Negative.    Gastrointestinal:  Positive for abdominal pain and constipation. Negative for abdominal distention, anal bleeding, blood in stool, diarrhea, nausea, rectal pain and vomiting.   All other systems reviewed and are negative.       Physical Exam:   Physical Exam  Vitals reviewed.   Constitutional:       General: He is not in acute distress.     Appearance: Normal appearance. He is well-groomed. He is not toxic-appearing.   HENT:      Head: Normocephalic and atraumatic.      Mouth/Throat:      Mouth: Mucous membranes are moist.   Eyes:      Extraocular Movements: Extraocular movements intact.   Cardiovascular:      Rate and Rhythm: Normal rate and regular rhythm.      Heart sounds: Normal heart sounds. No murmur heard.  Pulmonary:      Effort: Pulmonary effort is normal. No respiratory distress.      Breath sounds: Normal breath sounds. No stridor. No wheezing or rales.   Abdominal:      General: Bowel sounds are normal. There is no distension.      Palpations: Abdomen is soft. There is no mass.      Tenderness: There is no abdominal tenderness. There is no guarding or rebound.      Hernia: No hernia is present.   Skin:     General: Skin is warm.      Coloration: Skin is not jaundiced.      Findings: No rash.   Neurological:      Mental Status: He is alert and oriented to person, place, and time.   Psychiatric:         Mood and Affect: Mood and affect normal.         Speech: Speech normal.         Behavior: Behavior normal. Behavior is cooperative.         Thought Content: Thought content normal.          Lab Results:   Lab Results   Component Value Date    WBC 5.99 12/04/2024    HGB 17.0 12/04/2024    HCT 45.3 12/04/2024    MCV 94.6 12/04/2024    RDW 12.1 (L) 12/04/2024     12/04/2024    NEUTRORELPCT 55.4 12/04/2024    LYMPHORELPCT 30.4 12/04/2024    MONORELPCT 7.5 12/04/2024    EOSRELPCT 4.7 12/04/2024    BASORELPCT 1.3 12/04/2024    NEUTROABS 3.32 12/04/2024    LYMPHSABS 1.82  12/04/2024       Lab Results   Component Value Date     11/18/2024    K 4.1 11/18/2024    CO2 26.1 11/18/2024     11/18/2024    BUN 5 (L) 11/18/2024    CREATININE 0.98 11/18/2024    GLUCOSE 127 (H) 11/18/2024    CALCIUM 10.3 11/18/2024    ALKPHOS 54 11/18/2024    AST 52 (H) 11/18/2024    ALT 55 (H) 11/18/2024    BILITOT 0.8 11/18/2024    ALBUMIN 4.9 11/18/2024    PROTEINTOT 8.1 11/18/2024       Pathology:        Endoscopy:        Imaging:   No Images in the past 120 days found..      Results review: During today's encounter, all relevant clinical data has been reviewed.      Assessment and Plan    1. Elevated LFTs (Primary)  CMP collected on 11/18/2024 was notable for ALT 55, AST 52.  CT abdomen pelvis was notable for possible fatty liver disease with hepatosplenomegaly.  Will obtain the following lab work today.  Cannot rule out fatty liver disease versus liver disease secondary to hemochromatosis at this time.  -     Alpha - 1 - Antitrypsin  -     ZACHARY  -     Anti-microsomal Antibody  -     Anti-Smooth Muscle Antibody Titer  -     Celiac Comprehensive Panel  -     Ceruloplasmin  -     Comprehensive Metabolic Panel  -     ORTEGA Fibrosure Plus  -     Protime-INR  -     Mitochondrial Antibodies, M2  -     Hepatitis Panel, Acute    2. Irritable bowel syndrome with constipation  We will trial FiberCon daily.  -     polycarbophil (calcium polycarbophil) 625 MG tablet tablet; Take 1 tablet by mouth Daily.  Dispense: 60 tablet; Refill: 5    3. LUQ pain  4. Gastroesophageal reflux disease, unspecified whether esophagitis present  No acute findings on CT imaging to account for left upper quadrant pain.  Pain is worse with standing long periods of time and alleviated with sitting down.  Possible musculoskeletal in etiology.  Will initiate patient on Protonix 40 mg in case pain is secondary to chronic gastritis.  Will discontinue if no improvement in 8 weeks.  -     pantoprazole (Protonix) 40 MG EC tablet; Take 1  tablet by mouth Daily.  Dispense: 60 tablet; Refill: 2        New Medications:   New Medications Ordered This Visit   Medications    polycarbophil (calcium polycarbophil) 625 MG tablet tablet     Sig: Take 1 tablet by mouth Daily.     Dispense:  60 tablet     Refill:  5    pantoprazole (Protonix) 40 MG EC tablet     Sig: Take 1 tablet by mouth Daily.     Dispense:  60 tablet     Refill:  2       Discontinued Medications:   Medications Discontinued During This Encounter   Medication Reason    omeprazole (priLOSEC) 20 MG capsule Patient Reported Not Taking    linaclotide (LINZESS) 290 MCG capsule capsule Patient Reported Not Taking    metFORMIN (GLUCOPHAGE) 500 MG tablet Patient Reported Not Taking    ibuprofen (ADVIL,MOTRIN) 600 MG tablet Patient Reported Not Taking    omega-3 acid ethyl esters (LOVAZA) 1 g capsule Patient Reported Not Taking    acetaminophen (TYLENOL) 500 MG tablet Patient Reported Not Taking        Visit Diagnoses:    ICD-10-CM ICD-9-CM   1. Elevated LFTs  R79.89 790.6   2. Irritable bowel syndrome with constipation  K58.1 564.1   3. LUQ pain  R10.12 789.02   4. Gastroesophageal reflux disease, unspecified whether esophagitis present  K21.9 530.81            Follow Up:   Return in about 2 months (around 4/28/2025).    The patient was in agreement with the plan and all questions were answered to patient's satisfaction.        This document has been electronically signed by Delfina Bansk PA-C   February 28, 2025 10:17 EST    Dictated Utilizing Dragon Dictation: Part of this note may be an electronic transcription/translation of spoken language to printed text using the Dragon Dictation System.    CC:  Maryse Shane, MINOO Poon, Nallely Christopher, CHANG

## 2025-03-01 LAB
MITOCHONDRIA M2 IGG SER-ACNC: <20 UNITS (ref 0–20)
SMA IGG SER-ACNC: 3 UNITS (ref 0–19)

## 2025-03-03 ENCOUNTER — LAB (OUTPATIENT)
Dept: ONCOLOGY | Facility: CLINIC | Age: 45
End: 2025-03-03
Payer: COMMERCIAL

## 2025-03-03 ENCOUNTER — OFFICE VISIT (OUTPATIENT)
Dept: ONCOLOGY | Facility: CLINIC | Age: 45
End: 2025-03-03
Payer: COMMERCIAL

## 2025-03-03 VITALS
OXYGEN SATURATION: 99 % | WEIGHT: 200 LBS | BODY MASS INDEX: 25.67 KG/M2 | RESPIRATION RATE: 18 BRPM | DIASTOLIC BLOOD PRESSURE: 92 MMHG | TEMPERATURE: 97 F | HEART RATE: 80 BPM | SYSTOLIC BLOOD PRESSURE: 148 MMHG | HEIGHT: 74 IN

## 2025-03-03 DIAGNOSIS — E78.5 DYSLIPIDEMIA: ICD-10-CM

## 2025-03-03 DIAGNOSIS — E11.9 TYPE 2 DIABETES MELLITUS WITHOUT COMPLICATION, WITHOUT LONG-TERM CURRENT USE OF INSULIN: ICD-10-CM

## 2025-03-03 DIAGNOSIS — R79.89 ELEVATED FERRITIN: ICD-10-CM

## 2025-03-03 DIAGNOSIS — E83.110 HEREDITARY HEMOCHROMATOSIS: Primary | ICD-10-CM

## 2025-03-03 DIAGNOSIS — R16.1 SPLEEN ENLARGED: ICD-10-CM

## 2025-03-03 DIAGNOSIS — E83.110 HEREDITARY HEMOCHROMATOSIS: ICD-10-CM

## 2025-03-03 DIAGNOSIS — R16.2 HEPATOSPLENOMEGALY: ICD-10-CM

## 2025-03-03 DIAGNOSIS — R79.89 ELEVATED LFTS: ICD-10-CM

## 2025-03-03 LAB
ALBUMIN SERPL-MCNC: 4.4 G/DL (ref 3.5–5.2)
ALBUMIN/GLOB SERPL: 1.3 G/DL
ALP SERPL-CCNC: 51 U/L (ref 39–117)
ALT SERPL W P-5'-P-CCNC: 19 U/L (ref 1–41)
ANA SER QL: NEGATIVE
ANION GAP SERPL CALCULATED.3IONS-SCNC: 12.9 MMOL/L (ref 5–15)
AST SERPL-CCNC: 26 U/L (ref 1–40)
BASOPHILS # BLD AUTO: 0.09 10*3/MM3 (ref 0–0.2)
BASOPHILS NFR BLD AUTO: 1 % (ref 0–1.5)
BILIRUB SERPL-MCNC: 0.9 MG/DL (ref 0–1.2)
BUN SERPL-MCNC: 3 MG/DL (ref 6–20)
BUN/CREAT SERPL: 3.3 (ref 7–25)
CALCIUM SPEC-SCNC: 9.8 MG/DL (ref 8.6–10.5)
CHLORIDE SERPL-SCNC: 98 MMOL/L (ref 98–107)
CO2 SERPL-SCNC: 26.1 MMOL/L (ref 22–29)
CREAT SERPL-MCNC: 0.9 MG/DL (ref 0.76–1.27)
DEPRECATED RDW RBC AUTO: 37.7 FL (ref 37–54)
EGFRCR SERPLBLD CKD-EPI 2021: 107.3 ML/MIN/1.73
ENDOMYSIUM IGA SER QL: NEGATIVE
EOSINOPHIL # BLD AUTO: 0.12 10*3/MM3 (ref 0–0.4)
EOSINOPHIL NFR BLD AUTO: 1.3 % (ref 0.3–6.2)
ERYTHROCYTE [DISTWIDTH] IN BLOOD BY AUTOMATED COUNT: 11.7 % (ref 12.3–15.4)
FERRITIN SERPL-MCNC: 761.7 NG/ML (ref 30–400)
GLIADIN PEPTIDE IGA SER-ACNC: 16 UNITS (ref 0–19)
GLIADIN PEPTIDE IGG SER-ACNC: 5 UNITS (ref 0–19)
GLOBULIN UR ELPH-MCNC: 3.4 GM/DL
GLUCOSE SERPL-MCNC: 172 MG/DL (ref 65–99)
HCT VFR BLD AUTO: 45.8 % (ref 37.5–51)
HGB BLD-MCNC: 16 G/DL (ref 13–17.7)
IGA SERPL-MCNC: 510 MG/DL (ref 90–386)
IMM GRANULOCYTES # BLD AUTO: 0.04 10*3/MM3 (ref 0–0.05)
IMM GRANULOCYTES NFR BLD AUTO: 0.4 % (ref 0–0.5)
IRON 24H UR-MRATE: 128 MCG/DL (ref 59–158)
IRON SATN MFR SERPL: 41 % (ref 20–50)
LKM-1 AB SER-ACNC: <1 UNITS (ref 0–20)
LYMPHOCYTES # BLD AUTO: 2.97 10*3/MM3 (ref 0.7–3.1)
LYMPHOCYTES NFR BLD AUTO: 32.2 % (ref 19.6–45.3)
MCH RBC QN AUTO: 31.2 PG (ref 26.6–33)
MCHC RBC AUTO-ENTMCNC: 34.9 G/DL (ref 31.5–35.7)
MCV RBC AUTO: 89.3 FL (ref 79–97)
MONOCYTES # BLD AUTO: 0.67 10*3/MM3 (ref 0.1–0.9)
MONOCYTES NFR BLD AUTO: 7.3 % (ref 5–12)
NEUTROPHILS NFR BLD AUTO: 5.32 10*3/MM3 (ref 1.7–7)
NEUTROPHILS NFR BLD AUTO: 57.8 % (ref 42.7–76)
NRBC BLD AUTO-RTO: 0 /100 WBC (ref 0–0.2)
PLATELET # BLD AUTO: 206 10*3/MM3 (ref 140–450)
PMV BLD AUTO: 9.9 FL (ref 6–12)
POTASSIUM SERPL-SCNC: 3.9 MMOL/L (ref 3.5–5.2)
PROT SERPL-MCNC: 7.8 G/DL (ref 6–8.5)
RBC # BLD AUTO: 5.13 10*6/MM3 (ref 4.14–5.8)
SODIUM SERPL-SCNC: 137 MMOL/L (ref 136–145)
TIBC SERPL-MCNC: 311 MCG/DL (ref 298–536)
TRANSFERRIN SERPL-MCNC: 209 MG/DL (ref 200–360)
TTG IGA SER-ACNC: <2 U/ML (ref 0–3)
TTG IGG SER-ACNC: 2 U/ML (ref 0–5)
WBC NRBC COR # BLD AUTO: 9.21 10*3/MM3 (ref 3.4–10.8)

## 2025-03-03 PROCEDURE — 84466 ASSAY OF TRANSFERRIN: CPT

## 2025-03-03 PROCEDURE — 85025 COMPLETE CBC W/AUTO DIFF WBC: CPT

## 2025-03-03 PROCEDURE — 83540 ASSAY OF IRON: CPT

## 2025-03-03 PROCEDURE — 80053 COMPREHEN METABOLIC PANEL: CPT

## 2025-03-03 PROCEDURE — 82728 ASSAY OF FERRITIN: CPT

## 2025-03-03 NOTE — PROGRESS NOTES
Venipuncture Blood Specimen Collection  Venipuncture performed in left arm by Maria Isabel Woods MA with good hemostasis. Patient tolerated the procedure well without complications.   03/03/25   Maria Isabel Woods MA

## 2025-03-03 NOTE — PROGRESS NOTES
DATE:  3/3/2025    DIAGNOSIS: Hereditary hemochromatosis, elevated ferritin     CHIEF COMPLAINT:  LUQ pain    TREATMENT HISTORY:  Therapeutic phlebotomy-11/20/2024, 12/4/2024    HISTORY OF PRESENT ILLNESS:   Bry Rodriguez is a very pleasant 44 y.o. male who presented for follow up today of elevated ferritin/hereditary hemochromatosis.  Patient does have a history of diabetes that is currently being managed by his PCP.  Patient reports today that he follows with his PCP only as needed, with labs as needed as well.  However, he has been following with Viviana Villarreal more often who follows him for GI issues, but also has been performing his therapeutic phlebotomies.  Prior to that he was seeing [facility] for therapeutic phlebotomy.  His regimen has been every 2 weekly phlebotomies, his last phlebotomy being in September 2024.  We are awaiting office visit notes and workup from patient's previous hemochromatosis history and treatment.  Patient reports today that he has been receiving phlebotomies for about a 1-1.5 years  Recently had a CT of the abdomen pelvis on 5/2/2024 which revealed hepatosplenomegaly and fatty liver.  Of note he also has elevated LFTs and will be seeing [facility] in February.  He is complaining today of left upper quadrant pain related to his enlarged spleen.  At present, he denies any abnormal bruising or bleeding, chronic aches or pains, headaches, or dizziness.  He does report EtOH use.  He states today that he drinks 6 or more beers daily.  He does report daily NSAID use with 800 mg ibuprofen, as well as daily Tylenol use.  He reports taking these for the pain in his left upper quadrant.  Denies aspirin use.  Denies any known family history of hemochromatosis.  He does report eating a diet rich in iron and taking a daily multivitamin.  Denies any iron supplementation.  Upon review of available lab work patient was diagnosed with hereditary hemochromatosis- [id number]  "(heterozygous) on 7/28/2022.  Recent CBCs between 7/31/2022-10/2/2024 showed mostly normalized H&H, WBCs and platelets.  Ferritin on 10/2/2024 was 182.57.  Serum iron normal with mildly elevated iron saturation at 60%.  No other labs available for review today.  No other new or worsening complaints today.     Interval History:  Mr. Rodriguez is here today for follow-up of hereditary hemochromatosis and elevated ferritin level.  He reports doing well since his last visit with us.  He has had 2 therapeutic phlebotomies since seeing him last, as above.  He has been following with GI who is in the process of working him up for his belly pain and history of elevated LFTs and fatty liver.  He has stopped all EtOH use, he has had no alcohol for the past 2 months.  Otherwise no other new or specific complaints today.      The following portions of the patient's history were reviewed and updated as appropriate: allergies, current medications, past family history, past medical history, past social history, past surgical history and problem list.    PAST MEDICAL HISTORY:  Past Medical History:   Diagnosis Date    Colon polyps     Diabetes mellitus     Gall stones     Hemochromatosis     Hypertension     Palpitations        PAST SURGICAL HISTORY:  Past Surgical History:   Procedure Laterality Date    CHOLECYSTECTOMY N/A 8/18/2022    Procedure: CHOLECYSTECTOMY LAPAROSCOPIC WITH DAVINCI ROBOT;  Surgeon: Amandeep Rogers MD;  Location: [facility];  Service: Robotics - DaVinci;  Laterality: N/A;       SOCIAL HISTORY:  Social History     Socioeconomic History    Marital status:    Tobacco Use    Smoking status: Never    Smokeless tobacco: Never   Vaping Use    Vaping status: Never Used   Substance and Sexual Activity    Alcohol use: Yes     Alcohol/week: 42.0 standard drinks of alcohol     Types: 42 Cans of beer per week     Comment: \"drinks socially\"    Drug use: No    Sexual activity: Defer       FAMILY HISTORY:  Family " History   Problem Relation Age of Onset    Heart attack Mother 48    Heart attack Father 64    Heart disease Maternal Grandmother     Heart disease Maternal Grandfather          MEDICATIONS:  The current medication list was reviewed in the EMR    Current Outpatient Medications:     metoprolol tartrate (LOPRESSOR) 50 MG tablet, Take 1 tablet by mouth Every 12 (Twelve) Hours., Disp: 60 tablet, Rfl: 0    pantoprazole (Protonix) 40 MG EC tablet, Take 1 tablet by mouth Daily., Disp: 60 tablet, Rfl: 2    polycarbophil (calcium polycarbophil) 625 MG tablet tablet, Take 1 tablet by mouth Daily., Disp: 60 tablet, Rfl: 5    Tirzepatide (Mounjaro) 2.5 MG/0.5ML solution auto-injector, Inject 2.5 mg under the skin into the appropriate area as directed 1 (One) Time Per Week., Disp: , Rfl:     thiamine (VITAMIN B1) 100 MG tablet, Take 1 tablet by mouth Daily. (Patient not taking: Reported on 11/18/2024), Disp: 90 tablet, Rfl: 0    ALLERGIES:  No Known Allergies      REVIEW OF SYSTEMS:    A comprehensive 14 point review of systems was performed.  Significant findings as mentioned above.  All other systems reviewed and are negative.      ECOG score: 0           Physical Exam  Vital Signs:   Vitals:    03/03/25 1602   BP: 148/92   Pulse: 80   Resp: 18   Temp: 97 °F (36.1 °C)   SpO2: 99%        General: Well developed, well nourished, alert and oriented x 3, in no acute distress.   Head: ATNC   Eyes: PERRL, No evidence of conjunctivitis.   Nose: No nasal discharge.   Mouth: Oral mucosal membranes moist. No oral ulceration or hemorrhages.   Neck: Neck supple. No thyromegaly. No JVD.   Lungs: Clear in all fields to A&P.  No wheezing.  Heart: S1, S2. Regular rate and rhythm. No murmurs, rubs, or gallops.   Abdomen: Soft. Bowel sounds are normoactive. Nontender with palpation. No Hepatosplenomegaly can be appreciated.   Extremities: No cyanosis or edema. Peripheral pulses palpable and +2 bilaterally.   Integumentary: No rash, sores,  erythema or nodules. No blistering, bruising, or dry skin.   Neurologic: Alert, awake and oriented x 3.  Grossly nonfocal exam.    Pain Score:  Pain Score    25 1602   PainSc: 0-No pain       PHQ-Score Total:  PHQ-9 Total Score:         PATHOLOGY:        ENDOSCOPY:        [id number]         Previous labs:      10/2/24         7/8/24         7/31/22               7/28/22          10/4/16      Work Up (2024):   Lab Results   Component Value Date     WBC 8.61 2024     HGB 17.2 2024     HCT 47.2 2024     MCV 96.1 2024     RDW 11.7 (L) 2024      2024     NEUTRORELPCT 63.7 2024     LYMPHORELPCT 21.6 2024     MONORELPCT 12.1 (H) 2024     EOSRELPCT 1.5 2024     BASORELPCT 0.8 2024     NEUTROABS 5.48 2024     LYMPHSABS 1.86 2024               Lab Results   Component Value Date      2024     K 4.1 2024     CO2 26.1 2024      2024     BUN 5 (L) 2024     CREATININE 0.98 2024     GLUCOSE 127 (H) 2024     CALCIUM 10.3 2024     ALKPHOS 54 2024     AST 52 (H) 2024     ALT 55 (H) 2024     BILITOT 0.8 2024     ALBUMIN 4.9 2024     PROTEINTOT 8.1 2024      Hepatitis B Surface Ag  Non-Reactive Non-Reactive   Hep A IgM  Non-Reactive Non-Reactive   Hep B C IgM  Non-Reactive Non-Reactive   Hepatitis C Ab  Non-Reactive Non-Reactive      HIV-1/ HIV-2  Non-Reactive Non-Reactive            Lab Results   Component Value Date     FERRITIN 1,089.00 (H) 2024     IRON 118 2024     TIBC 399 2024     LABIRON 30 2024     [id number] 695 2024     FOLATE >20.00 2024      Rheumatoid Factor Quantitative  0.0 - 14.0 IU/mL <10.0      C-Reactive Protein  0.00 - 0.50 mg/dL 0.32      Sed Rate  0 - 15 mm/hr 1      TSH  0.270 - 4.200 uIU/mL 1.330      ALPHA-FETOPROTEIN  0 - 8.3 ng/mL 2.81     Vitamin B-12  211 - 946 pg/mL 696       Folate  4.78 - 24.20 ng/mL >20.00     ZACHARY Direct  Negative Negative     Copper  69 - 132 ug/dL 70                          Zinc  44 - 115 ug/dL 59     EBV Antibody Profile      Component  Ref Range & Units 3 mo ago   EBV VCA IgM  0.0 - 35.9 U/mL <36.0   Comment:                                  Negative        <36.0                                   Equivocal 36.0 - 43.9                                   Positive        >43.9   EBV VCA IgG  0.0 - 17.9 U/mL 497.0 High    Comment:                                  Negative        <18.0                                   Equivocal 18.0 - 21.9                                   Positive        >21.9   EBV Nuclear Antigen Ab, IgG  0.0 - 17.9 U/mL 328.0 High    Comment:                                  Negative        <18.0                                   Equivocal 18.0 - 21.9                                   Positive        >21.9   Interpretation Comment   Comment:                EBV Interpretation Chart  Key: Antibody Present +    Antibody Absent -  Interpretation             VCA-IgM   VCA-IgG  EBNA-IgG  No previous infection/        -         -         -  Susceptible  Primary infection (new        +         +         -  or recent)  Past Infection               +or-       +         +  See comment below*            +         -         -  *Results indicate infection with EBV at some time   however cannot predict the timing of the infection   since antibodies to EBNA usually develop after   primary infection or, alternatively, approximately   5-10% of patients with EBV never develop antibodies   to EBNA.        CMV DNA Quant  Negative IU/mL Negative   Comment: No CMV DNA detected.  The quantitative range of this assay is 200 to 1 million IU/mL.   log 10 CMV Qn DNA PI  log10 IU/mL TNP   Comment: Unable to calculate result since non-numeric result obtained for  component test.             RECENT LABS:  Lab Results   Component Value Date    WBC 9.21 03/03/2025    HGB 16.0  "03/03/2025    HCT 45.8 03/03/2025    MCV 89.3 03/03/2025    RDW 11.7 (L) 03/03/2025     03/03/2025    NEUTRORELPCT 57.8 03/03/2025    LYMPHORELPCT 32.2 03/03/2025    MONORELPCT 7.3 03/03/2025    EOSRELPCT 1.3 03/03/2025    BASORELPCT 1.0 03/03/2025    NEUTROABS 5.32 03/03/2025    LYMPHSABS 2.97 03/03/2025       Lab Results   Component Value Date     03/03/2025    K 3.9 03/03/2025    CO2 26.1 03/03/2025    CL 98 03/03/2025    BUN 3 (L) 03/03/2025    CREATININE 0.90 03/03/2025    GLUCOSE 172 (H) 03/03/2025    CALCIUM 9.8 03/03/2025    ALKPHOS 51 03/03/2025    AST 26 03/03/2025    ALT 19 03/03/2025    BILITOT 0.9 03/03/2025    ALBUMIN 4.4 03/03/2025    PROTEINTOT 7.8 03/03/2025       No results found for: \"LDH\", \"URICACID\"    Lab Results   Component Value Date    FERRITIN 761.70 (H) 03/03/2025    IRON 128 03/03/2025    TIBC 311 03/03/2025    LABIRON 41 03/03/2025    [id number] 695 11/18/2024    FOLATE >20.00 11/18/2024          ASSESSMENT & PLAN:  Bry Rodriguez is a very pleasant 44 y.o. male with    Hereditary hemochromatosis, ([id number] heterozygous)   Elevated ferritin  -Upon review of available lab work patient was diagnosed with hereditary hemochromatosis- [id number] (heterozygous) on 7/28/2022.  Recent CBCs between 7/31/2022-10/2/2024 showed mostly normalized H&H, WBCs and platelets.  Ferritin on 10/2/2024 was 182.57.  Serum iron normal with mildly elevated iron saturation at 60%.   -Patient has followed with hematology in the past and has been getting regular phlebotomies for about a 1-1.5 years, his last phlebotomy being in September 2024.  -Recent CT A/P on  5/2/2024 revealed hepatosplenomegaly and fatty liver.  -Denies abnormal bruising or bleeding, chronic aches or pains, headaches, or dizziness.  Denies aspirin use.  Denies any known family history of hemochromatosis.    -Patient does have a history of diabetes that is being controlled by his PCP.  -He does report daily ETOH use, drinking ~6 " or more beers daily.  He has since stopped drinking alcohol completely, and has not had any EtOH in 2 months.  -Reports daily NSAID use with 800 mg ibuprofen.  He has been trying to cut back on this.  -Reports eating a diet rich in iron and taking a daily multivitamin.  He has since stopped taking his MVI.  -Obtained additional labs at consultation to further evaluate including CBC, CMP, iron studies, ferritin, CRP, and ESR.  Also obtained hepatitis panel, HIV panel, vitamin B12, folate, ZACHARY, RF, copper, zinc, TSH, AFP and PBS.  Workup was unremarkable.    -EBV was obtained due to splenomegaly on CT, which showed evidence of past infection but no current infection.  CMV negative.  -At consultation CBC again showed normalized counts.  Iron panel revealed normal iron (118) and iron saturation (30%) with elevated ferritin of 1,089 ng/mL.  Patient received a therapeutic phlebotomy on 11/20/2024, and again on 12/4/2024.   -Discussed with the patient that several issues could be contributing to his elevated ferritin level including his diagnosis of hereditary hemochromatosis, metabolic syndrome (discussed importance of a healthy diet, exercise, blood sugar and cholesterol management), as well as a diet rich in iron.  -We discussed that he should avoid dietary and nutritional supplements that contain iron or vitamin C (written education provided on an iron rich diet to avoid). He also needs to avoid alcohol and uncooked seafood as patients with HH are at risk for certain infections with bacteria that thrive on the increased plasma iron concentrations (ex: Listeria, Yersinia, Vibrio).  -Patient with HH are at risk for cirrhosis and there by hepatocellular carcinoma and should be screened on a periodic basis.  Explained the importance of following up with GI.  AFP normal at last visit.  LFTs normal today.  He continues to follow with GI.  -Patient understands that she should have routine diabetes, thyroid, and osteoporosis  testing.  -CBC today normal.  CMP unremarkable, LFTs are normal today.  Iron profile normal.  Ferritin is again elevated, but improving, at 761.7.  Therapeutic phlebotomy is still indicated, will get patient appointment for this in the upcoming days.  -Will follow up in 3 months with repeat labs (CBC, CMP, Iron panel, Ferritin). In the meantime, he will continue [id number] ferritin check, with PRN phlebotomy for ferritin >300 until I see him back in 3 months.  In the interim we will also follow up with above pending lab work. Recommended that he continue to work on the issues above which is contributing in his case. He knows to avoid iron supplements, ETOH, MVI and iron rich foods in excess.     Elevated LFTs  -Recently had a CT of the abdomen pelvis on 5/2/2024 which revealed hepatosplenomegaly and fatty liver.  Of note he also has elevated LFTs and continues to follow with [facility].  -LFTs elevated at consultation with ALT of 55 and AST of 52.  -Obtained AFP which was normal.  HIV and hepatitis panels were negative.  -Fatty liver diet information provided.   -Advised against frequent Tylenol use.   -Instructed to cut back on EtOH use, in hopes of completely stopping to avoid any further liver damage from this.    -Patient has stopped drinking all EtOH for the past 2 months.  LFTs normalized today.  Next GI appointment is in April.      ACO / NINA/Other  Quality measures  -  Bry Rodriguez did not receive 2024 flu vaccine.  This was recommended.  -  Bry Rodriguez reports a pain score of 0.    -  Current outpatient and discharge medications have been reconciled for the patient.  Reviewed by: MINOO Claire        A total of 30 minutes were spent coordinating this patient’s care in clinic today; more than 50% of this time was face-to-face with the patient, reviewing his interim medical history and counseling on the current treatment and followup plan. All questions were answered to his  satisfaction.      Electronically Signed by: MINOO Claire  March 3, 2025 16:50 EST       CC:   No ref. provider found  Nallely Poon FNP

## 2025-03-05 ENCOUNTER — TELEPHONE (OUTPATIENT)
Dept: GASTROENTEROLOGY | Facility: CLINIC | Age: 45
End: 2025-03-05
Payer: COMMERCIAL

## 2025-03-05 LAB
A2 MACROGLOB SERPL-MCNC: NORMAL
ALT SERPL W P-5'-P-CCNC: NORMAL U/L
APO A-I SERPL-MCNC: NORMAL
AST SERPL W P-5'-P-CCNC: NORMAL U/L
BILIRUB SERPL-MCNC: NORMAL MG/DL
CHOLEST SERPL-MCNC: NORMAL MG/DL
GGT SERPL-CCNC: NORMAL U/L
GLUCOSE SERPL-MCNC: NORMAL MG/DL
HAPTOGLOB SERPL-MCNC: NORMAL MG/DL
LIVER FIBR SCORE SERPL CALC.FIBROSURE: NORMAL
TRIGL SERPL-MCNC: NORMAL MG/DL

## 2025-03-05 NOTE — TELEPHONE ENCOUNTER
----- Message from Delfina Banks sent at 3/5/2025  8:28 AM EST -----  Please let patient know that his labs were unremarkable.

## 2025-03-06 ENCOUNTER — TELEPHONE (OUTPATIENT)
Dept: GASTROENTEROLOGY | Facility: CLINIC | Age: 45
End: 2025-03-06
Payer: COMMERCIAL

## 2025-03-06 DIAGNOSIS — R79.89 ELEVATED LFTS: Primary | ICD-10-CM

## 2025-03-06 NOTE — TELEPHONE ENCOUNTER
----- Message from Delfina Banks sent at 3/6/2025  8:39 AM EST -----  Please call patient and let him know that his lipid levels were too high for his [place] [facility]roSure to result.  We will have to recollect this.  Have him go to the [facility].  Orders have been placed.

## (undated) DEVICE — SUT VIC 0/0 UR6 27IN DYED J603H

## (undated) DEVICE — 40595 XL TRENDELENBURG POSITIONING KIT: Brand: 40595 XL TRENDELENBURG POSITIONING KIT

## (undated) DEVICE — APPL CHLORAPREP HI/LITE 26ML ORNG

## (undated) DEVICE — CVR DISP HUG U VAC STEEP TREND

## (undated) DEVICE — UNDERGLV SURG BIOGEL INDICAT PF 8 GRN

## (undated) DEVICE — ARM DRAPE

## (undated) DEVICE — BLADELESS OBTURATOR: Brand: WECK VISTA

## (undated) DEVICE — CANNULA SEAL

## (undated) DEVICE — ST TBG PNEUMOCLEAR EVAC SMOKE HIFLO

## (undated) DEVICE — PAD POSTN ARMBND 1P/U

## (undated) DEVICE — LARGE HEM-O-LOK CLIP APPLIER: Brand: ENDOWRIST

## (undated) DEVICE — COVER,MAYO STAND,STERILE: Brand: MEDLINE

## (undated) DEVICE — CADIERE FORCEPS: Brand: ENDOWRIST

## (undated) DEVICE — HOLDER: Brand: DEROYAL

## (undated) DEVICE — PERMANENT CAUTERY HOOK: Brand: ENDOWRIST

## (undated) DEVICE — ADHS SKIN PREMIERPRO EXOFIN TOPICAL HI/VISC .5ML

## (undated) DEVICE — DRAPE,UTILTY,TAPE,15X26, 4EA/PK: Brand: MEDLINE

## (undated) DEVICE — TUBING, SUCTION, 1/4" X 20', STRAIGHT: Brand: MEDLINE INDUSTRIES, INC.

## (undated) DEVICE — GLV SURG PREMIERPRO MIC LTX PF SZ7.5 BRN

## (undated) DEVICE — 2, DISPOSABLE SUCTION/IRRIGATOR WITH DISPOSABLE TIP: Brand: STRYKEFLOW

## (undated) DEVICE — PK LAP GEN 70

## (undated) DEVICE — SUT MNCRYL 4/0 PS2 18 IN

## (undated) DEVICE — INSUFFLATION NEEDLE TO ESTABLISH PNEUMOPERITONEUM.: Brand: INSUFFLATION NEEDLE

## (undated) DEVICE — PATIENT RETURN ELECTRODE, SINGLE-USE, CONTACT QUALITY MONITORING, ADULT, WITH 9FT CORD, FOR PATIENTS WEIGING OVER 33LBS. (15KG): Brand: MEGADYNE